# Patient Record
Sex: MALE | Race: OTHER | HISPANIC OR LATINO
[De-identification: names, ages, dates, MRNs, and addresses within clinical notes are randomized per-mention and may not be internally consistent; named-entity substitution may affect disease eponyms.]

---

## 2017-01-16 ENCOUNTER — FORM ENCOUNTER (OUTPATIENT)
Age: 82
End: 2017-01-16

## 2017-01-17 ENCOUNTER — APPOINTMENT (OUTPATIENT)
Dept: CT IMAGING | Facility: CLINIC | Age: 82
End: 2017-01-17

## 2017-01-17 ENCOUNTER — OUTPATIENT (OUTPATIENT)
Dept: OUTPATIENT SERVICES | Facility: HOSPITAL | Age: 82
LOS: 1 days | End: 2017-01-17
Payer: MEDICARE

## 2017-01-17 DIAGNOSIS — R91.1 SOLITARY PULMONARY NODULE: ICD-10-CM

## 2017-01-17 DIAGNOSIS — Z98.890 OTHER SPECIFIED POSTPROCEDURAL STATES: Chronic | ICD-10-CM

## 2017-01-17 PROCEDURE — 71250 CT THORAX DX C-: CPT

## 2017-02-09 ENCOUNTER — APPOINTMENT (OUTPATIENT)
Dept: PULMONOLOGY | Facility: CLINIC | Age: 82
End: 2017-02-09

## 2017-02-14 ENCOUNTER — OTHER (OUTPATIENT)
Age: 82
End: 2017-02-14

## 2017-03-06 ENCOUNTER — APPOINTMENT (OUTPATIENT)
Dept: PULMONOLOGY | Facility: CLINIC | Age: 82
End: 2017-03-06

## 2017-03-23 ENCOUNTER — EMERGENCY (EMERGENCY)
Facility: HOSPITAL | Age: 82
LOS: 1 days | Discharge: HOME | End: 2017-03-23
Attending: EMERGENCY MEDICINE
Payer: MEDICARE

## 2017-03-23 VITALS
OXYGEN SATURATION: 95 % | SYSTOLIC BLOOD PRESSURE: 147 MMHG | RESPIRATION RATE: 22 BRPM | HEART RATE: 80 BPM | WEIGHT: 225.09 LBS | DIASTOLIC BLOOD PRESSURE: 76 MMHG | HEIGHT: 65 IN | TEMPERATURE: 97 F

## 2017-03-23 VITALS
HEART RATE: 66 BPM | TEMPERATURE: 99 F | RESPIRATION RATE: 20 BRPM | DIASTOLIC BLOOD PRESSURE: 71 MMHG | OXYGEN SATURATION: 92 % | SYSTOLIC BLOOD PRESSURE: 151 MMHG

## 2017-03-23 DIAGNOSIS — R06.02 SHORTNESS OF BREATH: ICD-10-CM

## 2017-03-23 DIAGNOSIS — Z88.8 ALLERGY STATUS TO OTHER DRUGS, MEDICAMENTS AND BIOLOGICAL SUBSTANCES STATUS: ICD-10-CM

## 2017-03-23 DIAGNOSIS — Z88.2 ALLERGY STATUS TO SULFONAMIDES: ICD-10-CM

## 2017-03-23 DIAGNOSIS — Z79.899 OTHER LONG TERM (CURRENT) DRUG THERAPY: ICD-10-CM

## 2017-03-23 DIAGNOSIS — M54.5 LOW BACK PAIN: ICD-10-CM

## 2017-03-23 DIAGNOSIS — Z88.1 ALLERGY STATUS TO OTHER ANTIBIOTIC AGENTS STATUS: ICD-10-CM

## 2017-03-23 DIAGNOSIS — Z98.890 OTHER SPECIFIED POSTPROCEDURAL STATES: Chronic | ICD-10-CM

## 2017-03-23 DIAGNOSIS — I10 ESSENTIAL (PRIMARY) HYPERTENSION: ICD-10-CM

## 2017-03-23 LAB
ALBUMIN SERPL ELPH-MCNC: 3.9 G/DL — SIGNIFICANT CHANGE UP (ref 3.3–5.2)
ALP SERPL-CCNC: 72 U/L — SIGNIFICANT CHANGE UP (ref 40–120)
ALT FLD-CCNC: 14 U/L — SIGNIFICANT CHANGE UP
ANION GAP SERPL CALC-SCNC: 8 MMOL/L — SIGNIFICANT CHANGE UP (ref 5–17)
ANISOCYTOSIS BLD QL: SLIGHT — SIGNIFICANT CHANGE UP
APPEARANCE UR: CLEAR — SIGNIFICANT CHANGE UP
APTT BLD: 39 SEC — HIGH (ref 27.5–37.4)
AST SERPL-CCNC: 21 U/L — SIGNIFICANT CHANGE UP
BACTERIA # UR AUTO: ABNORMAL
BASOPHILS # BLD AUTO: 0 K/UL — SIGNIFICANT CHANGE UP (ref 0–0.2)
BILIRUB SERPL-MCNC: 0.3 MG/DL — LOW (ref 0.4–2)
BILIRUB UR-MCNC: NEGATIVE — SIGNIFICANT CHANGE UP
BUN SERPL-MCNC: 25 MG/DL — HIGH (ref 8–20)
CALCIUM SERPL-MCNC: 8.3 MG/DL — LOW (ref 8.6–10.2)
CHLORIDE SERPL-SCNC: 98 MMOL/L — SIGNIFICANT CHANGE UP (ref 98–107)
CO2 SERPL-SCNC: 36 MMOL/L — HIGH (ref 22–29)
COLOR SPEC: YELLOW — SIGNIFICANT CHANGE UP
CREAT SERPL-MCNC: 1.59 MG/DL — HIGH (ref 0.5–1.3)
DIFF PNL FLD: NEGATIVE — SIGNIFICANT CHANGE UP
EOSINOPHIL # BLD AUTO: 0 K/UL — SIGNIFICANT CHANGE UP (ref 0–0.5)
EPI CELLS # UR: SIGNIFICANT CHANGE UP
GLUCOSE SERPL-MCNC: 94 MG/DL — SIGNIFICANT CHANGE UP (ref 70–115)
GLUCOSE UR QL: NEGATIVE MG/DL — SIGNIFICANT CHANGE UP
HCT VFR BLD CALC: 35.3 % — LOW (ref 42–52)
HGB BLD-MCNC: 10.4 G/DL — LOW (ref 14–18)
INR BLD: 1.95 RATIO — HIGH (ref 0.88–1.16)
KETONES UR-MCNC: NEGATIVE — SIGNIFICANT CHANGE UP
LEUKOCYTE ESTERASE UR-ACNC: ABNORMAL
LYMPHOCYTES # BLD AUTO: 0.4 K/UL — LOW (ref 1–4.8)
LYMPHOCYTES # BLD AUTO: 4 % — LOW (ref 20–55)
MACROCYTES BLD QL: SLIGHT — SIGNIFICANT CHANGE UP
MCHC RBC-ENTMCNC: 26.3 PG — LOW (ref 27–31)
MCHC RBC-ENTMCNC: 29.5 G/DL — LOW (ref 32–36)
MCV RBC AUTO: 89.1 FL — SIGNIFICANT CHANGE UP (ref 80–94)
MICROCYTES BLD QL: SLIGHT — SIGNIFICANT CHANGE UP
MONOCYTES # BLD AUTO: 0.4 K/UL — SIGNIFICANT CHANGE UP (ref 0–0.8)
MONOCYTES NFR BLD AUTO: 5 % — SIGNIFICANT CHANGE UP (ref 3–10)
NEUTROPHILS # BLD AUTO: 9.4 K/UL — HIGH (ref 1.8–8)
NEUTROPHILS NFR BLD AUTO: 90 % — HIGH (ref 37–73)
NITRITE UR-MCNC: POSITIVE
NT-PROBNP SERPL-SCNC: 1355 PG/ML — HIGH (ref 0–300)
PH UR: 6.5 — SIGNIFICANT CHANGE UP (ref 4.8–8)
PLAT MORPH BLD: NORMAL — SIGNIFICANT CHANGE UP
PLATELET # BLD AUTO: 229 K/UL — SIGNIFICANT CHANGE UP (ref 150–400)
POIKILOCYTOSIS BLD QL AUTO: SLIGHT — SIGNIFICANT CHANGE UP
POTASSIUM SERPL-MCNC: 4.7 MMOL/L — SIGNIFICANT CHANGE UP (ref 3.5–5.3)
POTASSIUM SERPL-SCNC: 4.7 MMOL/L — SIGNIFICANT CHANGE UP (ref 3.5–5.3)
PROT SERPL-MCNC: 6.4 G/DL — LOW (ref 6.6–8.7)
PROT UR-MCNC: NEGATIVE MG/DL — SIGNIFICANT CHANGE UP
PROTHROM AB SERPL-ACNC: 21.7 SEC — HIGH (ref 9.8–12.7)
RBC # BLD: 3.96 M/UL — LOW (ref 4.6–6.2)
RBC # FLD: 16.4 % — HIGH (ref 11–15.6)
RBC BLD AUTO: ABNORMAL
RBC CASTS # UR COMP ASSIST: SIGNIFICANT CHANGE UP /HPF (ref 0–4)
SODIUM SERPL-SCNC: 142 MMOL/L — SIGNIFICANT CHANGE UP (ref 135–145)
SP GR SPEC: 1.01 — SIGNIFICANT CHANGE UP (ref 1.01–1.02)
TROPONIN T SERPL-MCNC: 0.04 NG/ML — SIGNIFICANT CHANGE UP (ref 0–0.06)
UROBILINOGEN FLD QL: NEGATIVE MG/DL — SIGNIFICANT CHANGE UP
VARIANT LYMPHS # BLD: 1 % — SIGNIFICANT CHANGE UP (ref 0–6)
WBC # BLD: 10.3 K/UL — SIGNIFICANT CHANGE UP (ref 4.8–10.8)
WBC # FLD AUTO: 10.3 K/UL — SIGNIFICANT CHANGE UP (ref 4.8–10.8)
WBC UR QL: ABNORMAL

## 2017-03-23 PROCEDURE — 72100 X-RAY EXAM L-S SPINE 2/3 VWS: CPT

## 2017-03-23 PROCEDURE — 93010 ELECTROCARDIOGRAM REPORT: CPT

## 2017-03-23 PROCEDURE — 72131 CT LUMBAR SPINE W/O DYE: CPT | Mod: 26

## 2017-03-23 PROCEDURE — 85610 PROTHROMBIN TIME: CPT

## 2017-03-23 PROCEDURE — 71010: CPT | Mod: 26

## 2017-03-23 PROCEDURE — 94640 AIRWAY INHALATION TREATMENT: CPT

## 2017-03-23 PROCEDURE — 72131 CT LUMBAR SPINE W/O DYE: CPT

## 2017-03-23 PROCEDURE — 96375 TX/PRO/DX INJ NEW DRUG ADDON: CPT

## 2017-03-23 PROCEDURE — 99284 EMERGENCY DEPT VISIT MOD MDM: CPT | Mod: 25

## 2017-03-23 PROCEDURE — 81001 URINALYSIS AUTO W/SCOPE: CPT

## 2017-03-23 PROCEDURE — 96374 THER/PROPH/DIAG INJ IV PUSH: CPT

## 2017-03-23 PROCEDURE — 93005 ELECTROCARDIOGRAM TRACING: CPT

## 2017-03-23 PROCEDURE — 99285 EMERGENCY DEPT VISIT HI MDM: CPT

## 2017-03-23 PROCEDURE — 84484 ASSAY OF TROPONIN QUANT: CPT

## 2017-03-23 PROCEDURE — 85027 COMPLETE CBC AUTOMATED: CPT

## 2017-03-23 PROCEDURE — 71045 X-RAY EXAM CHEST 1 VIEW: CPT

## 2017-03-23 PROCEDURE — 85730 THROMBOPLASTIN TIME PARTIAL: CPT

## 2017-03-23 PROCEDURE — 72100 X-RAY EXAM L-S SPINE 2/3 VWS: CPT | Mod: 26

## 2017-03-23 PROCEDURE — 83880 ASSAY OF NATRIURETIC PEPTIDE: CPT

## 2017-03-23 PROCEDURE — 87186 SC STD MICRODIL/AGAR DIL: CPT

## 2017-03-23 PROCEDURE — 80053 COMPREHEN METABOLIC PANEL: CPT

## 2017-03-23 PROCEDURE — 87086 URINE CULTURE/COLONY COUNT: CPT

## 2017-03-23 RX ORDER — FUROSEMIDE 40 MG
40 TABLET ORAL ONCE
Qty: 0 | Refills: 0 | Status: COMPLETED | OUTPATIENT
Start: 2017-03-23 | End: 2017-03-23

## 2017-03-23 RX ORDER — HYDROMORPHONE HYDROCHLORIDE 2 MG/ML
1 INJECTION INTRAMUSCULAR; INTRAVENOUS; SUBCUTANEOUS ONCE
Qty: 0 | Refills: 0 | Status: DISCONTINUED | OUTPATIENT
Start: 2017-03-23 | End: 2017-03-23

## 2017-03-23 RX ORDER — MORPHINE SULFATE 50 MG/1
4 CAPSULE, EXTENDED RELEASE ORAL ONCE
Qty: 0 | Refills: 0 | Status: DISCONTINUED | OUTPATIENT
Start: 2017-03-23 | End: 2017-03-23

## 2017-03-23 RX ORDER — IPRATROPIUM/ALBUTEROL SULFATE 18-103MCG
3 AEROSOL WITH ADAPTER (GRAM) INHALATION ONCE
Qty: 0 | Refills: 0 | Status: COMPLETED | OUTPATIENT
Start: 2017-03-23 | End: 2017-03-23

## 2017-03-23 RX ORDER — MAGNESIUM SULFATE 500 MG/ML
1 VIAL (ML) INJECTION ONCE
Qty: 0 | Refills: 0 | Status: COMPLETED | OUTPATIENT
Start: 2017-03-23 | End: 2017-03-23

## 2017-03-23 RX ORDER — SODIUM CHLORIDE 9 MG/ML
3 INJECTION INTRAMUSCULAR; INTRAVENOUS; SUBCUTANEOUS ONCE
Qty: 0 | Refills: 0 | Status: COMPLETED | OUTPATIENT
Start: 2017-03-23 | End: 2017-03-23

## 2017-03-23 RX ADMIN — HYDROMORPHONE HYDROCHLORIDE 1 MILLIGRAM(S): 2 INJECTION INTRAMUSCULAR; INTRAVENOUS; SUBCUTANEOUS at 15:59

## 2017-03-23 RX ADMIN — Medication 100 GRAM(S): at 13:43

## 2017-03-23 RX ADMIN — MORPHINE SULFATE 4 MILLIGRAM(S): 50 CAPSULE, EXTENDED RELEASE ORAL at 13:43

## 2017-03-23 RX ADMIN — Medication 40 MILLIGRAM(S): at 15:59

## 2017-03-23 RX ADMIN — Medication 125 MILLIGRAM(S): at 13:43

## 2017-03-23 RX ADMIN — Medication 3 MILLILITER(S): at 14:01

## 2017-03-23 RX ADMIN — MORPHINE SULFATE 4 MILLIGRAM(S): 50 CAPSULE, EXTENDED RELEASE ORAL at 13:44

## 2017-03-23 RX ADMIN — SODIUM CHLORIDE 3 MILLILITER(S): 9 INJECTION INTRAMUSCULAR; INTRAVENOUS; SUBCUTANEOUS at 13:44

## 2017-03-23 RX ADMIN — Medication 3 MILLILITER(S): at 15:15

## 2017-03-23 NOTE — ED ADULT TRIAGE NOTE - CHIEF COMPLAINT QUOTE
Patient arrived in wheelchair, awake, alert, and oriented times 3, breathing labored.  As per wife patient has started to have labored breathing which started 4 days ago.  patient present in ED complaining of lower back pain which started last night.  patient had similar back pain 2 weeks ago and subsided.  patient on home O2 due to CHF and COPD

## 2017-03-23 NOTE — ED ADULT NURSE REASSESSMENT NOTE - NS ED NURSE REASSESS COMMENT FT1
pt by accident pulled out iv at xray, new one placed
iv placed labs sent pt on cardiac monitor, pt medicated as per md orders
pt stable for discharge iv removed discharge instruction given with understanding, pt taken out via wheelhair

## 2017-03-23 NOTE — ED ADULT NURSE NOTE - OBJECTIVE STATEMENT
pt arrived with family with severe sob, and back pain, as per wife pt did not hurt his back but has lower back pain 9/10 woke up with this pain denies trauma, unable to walk or sleep without being in pain, states started weeks ago and progressively has gotten worse, pt with history of copd wears 02 at home pt with sob, and labored breathing, denies chest pain

## 2017-03-23 NOTE — ED PROVIDER NOTE - PMH
A-fib    CHF (congestive heart failure)    COPD (chronic obstructive pulmonary disease)    HTN (hypertension)    Urinary retention

## 2017-03-23 NOTE — ED PROVIDER NOTE - OBJECTIVE STATEMENT
Pt is an 86 year old male with hx of COPD on home oxygen and CHF comes in with SOB for past few days as well as lower back pain. No fall or injury. It ok when he is stationary but gets worse with attempts to move. No chest pain. Pt is an 86 year old male with hx of COPD on home oxygen and CHF comes in with SOB for past few days as well as lower back pain. No fall or injury. It ok when he is stationary but gets worse with attempts to move or get up.  No chest pain.  No abdominal pain. His sob is baseline and not worse then usual. He uses a adams cath chronically. No dysuria. Pain does not travel down the leg.

## 2017-03-23 NOTE — ED PROVIDER NOTE - NS ED ROS FT
Review of Systems:  	•	CONSTITUTIONAL : no fever or weight change  	•	SKIN : no rash  	•	HEMATOLOGIC : no petechia, no bruising  	•	EYES : no eye pain, no blurred vision  	•	ENT : no change in hearing, no sore throat  	•	RESPIRATORY : + sob  	•	CARDIAC : no chest pain, no palpitations  	•	GI : + abd pain  	•	MUSCULOSKELETAL : + lower back pain  	•	NEUROLOGIC : no weakness, no headache, no anesthesia, no paresthesias

## 2017-03-25 NOTE — ED POST DISCHARGE NOTE - RESULT SUMMARY
Spoke with PT and advised of urine culture results. He reports he has a catheter and wishes to call his urologist jeremiah. He does not want abx rx now and will contact his urologist t

## 2017-04-10 ENCOUNTER — INPATIENT (INPATIENT)
Facility: HOSPITAL | Age: 82
LOS: 3 days | Discharge: HOME | DRG: 190 | End: 2017-04-14
Attending: HOSPITALIST | Admitting: INTERNAL MEDICINE
Payer: MEDICARE

## 2017-04-10 VITALS
DIASTOLIC BLOOD PRESSURE: 65 MMHG | OXYGEN SATURATION: 92 % | HEART RATE: 77 BPM | WEIGHT: 214.95 LBS | SYSTOLIC BLOOD PRESSURE: 151 MMHG | TEMPERATURE: 98 F | HEIGHT: 66 IN | RESPIRATION RATE: 18 BRPM

## 2017-04-10 DIAGNOSIS — Z29.9 ENCOUNTER FOR PROPHYLACTIC MEASURES, UNSPECIFIED: ICD-10-CM

## 2017-04-10 DIAGNOSIS — G47.30 SLEEP APNEA, UNSPECIFIED: ICD-10-CM

## 2017-04-10 DIAGNOSIS — Z98.890 OTHER SPECIFIED POSTPROCEDURAL STATES: Chronic | ICD-10-CM

## 2017-04-10 DIAGNOSIS — I48.91 UNSPECIFIED ATRIAL FIBRILLATION: ICD-10-CM

## 2017-04-10 DIAGNOSIS — J44.9 CHRONIC OBSTRUCTIVE PULMONARY DISEASE, UNSPECIFIED: ICD-10-CM

## 2017-04-10 DIAGNOSIS — K92.2 GASTROINTESTINAL HEMORRHAGE, UNSPECIFIED: ICD-10-CM

## 2017-04-10 DIAGNOSIS — R05 COUGH: ICD-10-CM

## 2017-04-10 DIAGNOSIS — E66.9 OBESITY, UNSPECIFIED: ICD-10-CM

## 2017-04-10 DIAGNOSIS — R09.02 HYPOXEMIA: ICD-10-CM

## 2017-04-10 LAB
ALBUMIN SERPL ELPH-MCNC: 3.8 G/DL — SIGNIFICANT CHANGE UP (ref 3.3–5.2)
ALP SERPL-CCNC: 64 U/L — SIGNIFICANT CHANGE UP (ref 40–120)
ALT FLD-CCNC: 19 U/L — SIGNIFICANT CHANGE UP
ANION GAP SERPL CALC-SCNC: 8 MMOL/L — SIGNIFICANT CHANGE UP (ref 5–17)
ANISOCYTOSIS BLD QL: SLIGHT — SIGNIFICANT CHANGE UP
APPEARANCE UR: CLEAR — SIGNIFICANT CHANGE UP
APTT BLD: 37.4 SEC — SIGNIFICANT CHANGE UP (ref 27.5–37.4)
AST SERPL-CCNC: 19 U/L — SIGNIFICANT CHANGE UP
BACTERIA # UR AUTO: ABNORMAL
BILIRUB SERPL-MCNC: 0.2 MG/DL — LOW (ref 0.4–2)
BILIRUB UR-MCNC: NEGATIVE — SIGNIFICANT CHANGE UP
BLD GP AB SCN SERPL QL: SIGNIFICANT CHANGE UP
BUN SERPL-MCNC: 38 MG/DL — HIGH (ref 8–20)
CALCIUM SERPL-MCNC: 8.6 MG/DL — SIGNIFICANT CHANGE UP (ref 8.6–10.2)
CHLORIDE SERPL-SCNC: 99 MMOL/L — SIGNIFICANT CHANGE UP (ref 98–107)
CO2 SERPL-SCNC: 37 MMOL/L — HIGH (ref 22–29)
COLOR SPEC: YELLOW — SIGNIFICANT CHANGE UP
CREAT SERPL-MCNC: 1.95 MG/DL — HIGH (ref 0.5–1.3)
DIFF PNL FLD: ABNORMAL
EPI CELLS # UR: SIGNIFICANT CHANGE UP
GLUCOSE SERPL-MCNC: 129 MG/DL — HIGH (ref 70–115)
GLUCOSE UR QL: NEGATIVE MG/DL — SIGNIFICANT CHANGE UP
HCT VFR BLD CALC: 30.3 % — LOW (ref 42–52)
HCT VFR BLD CALC: 30.7 % — LOW (ref 42–52)
HGB BLD-MCNC: 9 G/DL — LOW (ref 14–18)
HGB BLD-MCNC: 9 G/DL — LOW (ref 14–18)
HYALINE CASTS # UR AUTO: ABNORMAL /LPF
HYPOCHROMIA BLD QL: SLIGHT — SIGNIFICANT CHANGE UP
INR BLD: 2.71 RATIO — HIGH (ref 0.88–1.16)
KETONES UR-MCNC: NEGATIVE — SIGNIFICANT CHANGE UP
LEUKOCYTE ESTERASE UR-ACNC: ABNORMAL
LYMPHOCYTES # BLD AUTO: 0.4 K/UL — LOW (ref 1–4.8)
LYMPHOCYTES # BLD AUTO: 3 % — LOW (ref 20–55)
MACROCYTES BLD QL: SLIGHT — SIGNIFICANT CHANGE UP
MCHC RBC-ENTMCNC: 25.9 PG — LOW (ref 27–31)
MCHC RBC-ENTMCNC: 26.1 PG — LOW (ref 27–31)
MCHC RBC-ENTMCNC: 29.3 G/DL — LOW (ref 32–36)
MCHC RBC-ENTMCNC: 29.7 G/DL — LOW (ref 32–36)
MCV RBC AUTO: 87.8 FL — SIGNIFICANT CHANGE UP (ref 80–94)
MCV RBC AUTO: 88.2 FL — SIGNIFICANT CHANGE UP (ref 80–94)
MICROCYTES BLD QL: SLIGHT — SIGNIFICANT CHANGE UP
MONOCYTES # BLD AUTO: 0.7 K/UL — SIGNIFICANT CHANGE UP (ref 0–0.8)
MONOCYTES NFR BLD AUTO: 6 % — SIGNIFICANT CHANGE UP (ref 3–10)
NEUTROPHILS # BLD AUTO: 11.9 K/UL — HIGH (ref 1.8–8)
NEUTROPHILS NFR BLD AUTO: 90 % — HIGH (ref 37–73)
NITRITE UR-MCNC: NEGATIVE — SIGNIFICANT CHANGE UP
NT-PROBNP SERPL-SCNC: 1865 PG/ML — HIGH (ref 0–300)
OB PNL STL: POSITIVE
PH UR: 7 — SIGNIFICANT CHANGE UP (ref 4.8–8)
PLAT MORPH BLD: NORMAL — SIGNIFICANT CHANGE UP
PLATELET # BLD AUTO: 244 K/UL — SIGNIFICANT CHANGE UP (ref 150–400)
PLATELET # BLD AUTO: 253 K/UL — SIGNIFICANT CHANGE UP (ref 150–400)
POIKILOCYTOSIS BLD QL AUTO: SLIGHT — SIGNIFICANT CHANGE UP
POTASSIUM SERPL-MCNC: 4.7 MMOL/L — SIGNIFICANT CHANGE UP (ref 3.5–5.3)
POTASSIUM SERPL-SCNC: 4.7 MMOL/L — SIGNIFICANT CHANGE UP (ref 3.5–5.3)
PROT SERPL-MCNC: 6.2 G/DL — LOW (ref 6.6–8.7)
PROT UR-MCNC: NEGATIVE MG/DL — SIGNIFICANT CHANGE UP
PROTHROM AB SERPL-ACNC: 30.4 SEC — HIGH (ref 9.8–12.7)
RBC # BLD: 3.45 M/UL — LOW (ref 4.6–6.2)
RBC # BLD: 3.48 M/UL — LOW (ref 4.6–6.2)
RBC # FLD: 15.6 % — SIGNIFICANT CHANGE UP (ref 11–15.6)
RBC # FLD: 15.7 % — HIGH (ref 11–15.6)
RBC BLD AUTO: ABNORMAL
RBC CASTS # UR COMP ASSIST: SIGNIFICANT CHANGE UP /HPF (ref 0–4)
SODIUM SERPL-SCNC: 144 MMOL/L — SIGNIFICANT CHANGE UP (ref 135–145)
SP GR SPEC: 1.01 — SIGNIFICANT CHANGE UP (ref 1.01–1.02)
TROPONIN T SERPL-MCNC: 0.04 NG/ML — SIGNIFICANT CHANGE UP (ref 0–0.06)
TYPE + AB SCN PNL BLD: SIGNIFICANT CHANGE UP
UROBILINOGEN FLD QL: NEGATIVE MG/DL — SIGNIFICANT CHANGE UP
VARIANT LYMPHS # BLD: 1 % — SIGNIFICANT CHANGE UP (ref 0–6)
WBC # BLD: 11.4 K/UL — HIGH (ref 4.8–10.8)
WBC # BLD: 13 K/UL — HIGH (ref 4.8–10.8)
WBC # FLD AUTO: 11.4 K/UL — HIGH (ref 4.8–10.8)
WBC # FLD AUTO: 13 K/UL — HIGH (ref 4.8–10.8)
WBC UR QL: ABNORMAL

## 2017-04-10 PROCEDURE — 99223 1ST HOSP IP/OBS HIGH 75: CPT

## 2017-04-10 PROCEDURE — 99222 1ST HOSP IP/OBS MODERATE 55: CPT

## 2017-04-10 PROCEDURE — 71010: CPT | Mod: 26

## 2017-04-10 PROCEDURE — 99285 EMERGENCY DEPT VISIT HI MDM: CPT

## 2017-04-10 RX ORDER — HYDRALAZINE HCL 50 MG
50 TABLET ORAL
Qty: 0 | Refills: 0 | Status: DISCONTINUED | OUTPATIENT
Start: 2017-04-10 | End: 2017-04-11

## 2017-04-10 RX ORDER — AZITHROMYCIN 500 MG/1
500 TABLET, FILM COATED ORAL EVERY 24 HOURS
Qty: 0 | Refills: 0 | Status: COMPLETED | OUTPATIENT
Start: 2017-04-11 | End: 2017-04-14

## 2017-04-10 RX ORDER — ERGOCALCIFEROL 1.25 MG/1
0 CAPSULE ORAL
Qty: 0 | Refills: 0 | COMMUNITY

## 2017-04-10 RX ORDER — PANTOPRAZOLE SODIUM 20 MG/1
40 TABLET, DELAYED RELEASE ORAL
Qty: 0 | Refills: 0 | Status: DISCONTINUED | OUTPATIENT
Start: 2017-04-10 | End: 2017-04-12

## 2017-04-10 RX ORDER — POTASSIUM CHLORIDE 20 MEQ
1 PACKET (EA) ORAL
Qty: 0 | Refills: 0 | COMMUNITY

## 2017-04-10 RX ORDER — FUROSEMIDE 40 MG
40 TABLET ORAL ONCE
Qty: 0 | Refills: 0 | Status: COMPLETED | OUTPATIENT
Start: 2017-04-10 | End: 2017-04-10

## 2017-04-10 RX ORDER — ALBUTEROL 90 UG/1
2.5 AEROSOL, METERED ORAL ONCE
Qty: 0 | Refills: 0 | Status: COMPLETED | OUTPATIENT
Start: 2017-04-10 | End: 2017-04-10

## 2017-04-10 RX ORDER — ALLOPURINOL 300 MG
100 TABLET ORAL DAILY
Qty: 0 | Refills: 0 | Status: DISCONTINUED | OUTPATIENT
Start: 2017-04-10 | End: 2017-04-14

## 2017-04-10 RX ORDER — DOCUSATE SODIUM 100 MG
100 CAPSULE ORAL AT BEDTIME
Qty: 0 | Refills: 0 | Status: DISCONTINUED | OUTPATIENT
Start: 2017-04-10 | End: 2017-04-14

## 2017-04-10 RX ORDER — IPRATROPIUM/ALBUTEROL SULFATE 18-103MCG
3 AEROSOL WITH ADAPTER (GRAM) INHALATION ONCE
Qty: 0 | Refills: 0 | Status: COMPLETED | OUTPATIENT
Start: 2017-04-10 | End: 2017-04-10

## 2017-04-10 RX ORDER — DILTIAZEM HCL 120 MG
240 CAPSULE, EXT RELEASE 24 HR ORAL DAILY
Qty: 0 | Refills: 0 | Status: DISCONTINUED | OUTPATIENT
Start: 2017-04-10 | End: 2017-04-14

## 2017-04-10 RX ORDER — IPRATROPIUM/ALBUTEROL SULFATE 18-103MCG
3 AEROSOL WITH ADAPTER (GRAM) INHALATION EVERY 6 HOURS
Qty: 0 | Refills: 0 | Status: DISCONTINUED | OUTPATIENT
Start: 2017-04-10 | End: 2017-04-14

## 2017-04-10 RX ORDER — AZITHROMYCIN 500 MG/1
TABLET, FILM COATED ORAL
Qty: 0 | Refills: 0 | Status: COMPLETED | OUTPATIENT
Start: 2017-04-10 | End: 2017-04-14

## 2017-04-10 RX ORDER — POLYETHYLENE GLYCOL 3350 17 G/17G
17 POWDER, FOR SOLUTION ORAL
Qty: 0 | Refills: 0 | Status: DISCONTINUED | OUTPATIENT
Start: 2017-04-10 | End: 2017-04-14

## 2017-04-10 RX ORDER — AZITHROMYCIN 500 MG/1
500 TABLET, FILM COATED ORAL ONCE
Qty: 0 | Refills: 0 | Status: COMPLETED | OUTPATIENT
Start: 2017-04-10 | End: 2017-04-10

## 2017-04-10 RX ADMIN — Medication 3 MILLILITER(S): at 13:23

## 2017-04-10 RX ADMIN — Medication 50 MILLIGRAM(S): at 18:13

## 2017-04-10 RX ADMIN — AZITHROMYCIN 255 MILLIGRAM(S): 500 TABLET, FILM COATED ORAL at 19:47

## 2017-04-10 RX ADMIN — Medication 40 MILLIGRAM(S): at 17:10

## 2017-04-10 RX ADMIN — Medication 3 MILLILITER(S): at 21:44

## 2017-04-10 RX ADMIN — Medication 100 MILLIGRAM(S): at 21:45

## 2017-04-10 RX ADMIN — Medication 40 MILLIGRAM(S): at 21:43

## 2017-04-10 RX ADMIN — ALBUTEROL 2.5 MILLIGRAM(S): 90 AEROSOL, METERED ORAL at 13:00

## 2017-04-10 RX ADMIN — PANTOPRAZOLE SODIUM 40 MILLIGRAM(S): 20 TABLET, DELAYED RELEASE ORAL at 18:13

## 2017-04-10 NOTE — CONSULT NOTE ADULT - SUBJECTIVE AND OBJECTIVE BOX
PULMONARY CONSULT NOTE      JESS HUNTER  MRN-[id number]    Patient is a 86y old  Male who presents with a chief complaint of SOB, LE edema (10 Apr 2017 15:13)    HISTORY OF PRESENT ILLNESS:    86 y M hx afib on coumadin, COPD on 2L home O2, neurogenic bladder w chronic indwelling cathether, CHF, anemia, presented to ER c/o several day hx worsening SOB, LE edema since past 2 days. Today noted BRBPR upon wiping. Has refused colonoscopy in the past. Denies CP, F/C, N/v/D/C, abd pain. Coumadin held 2 d ago due to increased bruising. Wife also reports several recent falls at home, denies head trauma or LOC. Prednisone dose recently increased without improvement in SOB.  He reports some improvement since coming to ER.      MEDICATIONS  (STANDING):  methylPREDNISolone sodium succinate Injectable 40milliGRAM(s) IV Push every 8 hours  ALBUTerol/ipratropium for Nebulization 3milliLiter(s) Nebulizer every 6 hours  pantoprazole  Injectable 40milliGRAM(s) IV Push two times a day  diltiazem   CD 240milliGRAM(s) Oral daily  allopurinol 100milliGRAM(s) Oral daily  hydrALAZINE 50milliGRAM(s) Oral two times a day  polyethylene glycol 3350 17Gram(s) Oral two times a day  docusate sodium 100milliGRAM(s) Oral at bedtime      MEDICATIONS  (PRN):      Allergies    Ambien (Unknown)  Celebrex (Unknown)  Levaquin (Unknown)  Spiriva (Unknown)    Intolerances        PAST MEDICAL & SURGICAL HISTORY:  Urinary retention  A-fib  COPD (chronic obstructive pulmonary disease)  HTN (hypertension)  CHF (congestive heart failure)  No pertinent past medical history  History of hip surgery: Right hip and knee replacement  No significant past surgical history      FAMILY HISTORY:  No pertinent family history in first degree relatives      SOCIAL HISTORY  Smoking History:     REVIEW OF SYSTEMS:    CONSTITUTIONAL:  No fevers, chills, sweats    HEENT:  Eyes:  No diplopia or blurred vision. ENT:  No earache, sore throat or runny nose.    CARDIOVASCULAR:  No pressure, squeezing, tightness, or heaviness about the chest; no palpitations.    RESPIRATORY:  Per HPI    GASTROINTESTINAL:  No abdominal pain, nausea, vomiting or diarrhea.    GENITOURINARY:  No dysuria, frequency or urgency.    NEUROLOGIC:  No paresthesias, fasciculations, seizures or weakness.    PSYCHIATRIC:  No disorder of thought or mood.    Vital Signs Last 24 Hrs  T(C): 36.8, Max: 36.8 (04-10 @ 18:17)  T(F): 98.3, Max: 98.3 (04-10 @ 18:17)  HR: 79 (60 - 80)  BP: 162/98 (151/65 - 167/72)  BP(mean): --  RR: 18 (18 - 22)  SpO2: 95% (92% - 99%)    PHYSICAL EXAMINATION:    GENERAL: The patient is a well-developed, well-nourished obese male in no apparent distress.     HEENT: Head is normocephalic and atraumatic. Extraocular muscles are intact. Mucous membranes are moist.     NECK: Supple.     LUNGS: Wheeze b/l. Respirations unlabored    HEART: Regular rate and rhythm without murmur.    ABDOMEN: Soft, nontender, and nondistended.  No hepatosplenomegaly is noted.    EXTREMITIES: Without any cyanosis, clubbing, rash, lesions or edema.    NEUROLOGIC: Grossly intact.      LABS:                        9.0    13.0  )-----------( 244      ( 10 Apr 2017 13:47 )             30.7     04-10    144  |  99  |  38.0<H>  ----------------------------<  129<H>  4.7   |  37.0<H>  |  1.95<H>    Ca    8.6      10 Apr 2017 13:47    TPro  6.2<L>  /  Alb  3.8  /  TBili  0.2<L>  /  DBili  x   /  AST  19  /  ALT  19  /  AlkPhos  64  04-10    PT/INR - ( 10 Apr 2017 13:47 )   PT: 30.4 sec;   INR: 2.71 ratio         PTT - ( 10 Apr 2017 13:47 )  PTT:37.4 sec  Urinalysis Basic - ( 10 Apr 2017 17:37 )    Color: Yellow / Appearance: Clear / S.010 / pH: x  Gluc: x / Ketone: Negative  / Bili: Negative / Urobili: Negative mg/dL   Blood: x / Protein: Negative mg/dL / Nitrite: Negative   Leuk Esterase: Moderate / RBC: 0-2 /HPF / WBC 6-10   Sq Epi: x / Non Sq Epi: Occasional / Bacteria: Few        CARDIAC MARKERS ( 10 Apr 2017 13:47 )  x     / 0.04 ng/mL / x     / x     / x            Serum Pro-Brain Natriuretic Peptide: 1865 pg/mL (04-10 @ 13:47)      RADIOLOGY & ADDITIONAL STUDIES:    EXAM:  CHEST SINGLE VIEW FRONTAL                          PROCEDURE DATE:  04/10/2017        INTERPRETATION:  History: dyspnea    Technique:  AP portable    Comparisons:  Chest x-ray dated 2016    Findings: The lungs are clear. There are no infiltrates, congestion or   pleural effusions. Pulmonary vasculature is normal. Aortic ectasia..   Cardiomegaly unchanged.. The thorax is normal for age.    Impression: No acute pulmonary disease. Cardiomegaly.    No interval change        RAÚL GRAYSON M.D., ATTENDING RADIOLOGIST  This document has been electronically signed. Apr 10 2017  1:26PM PULMONARY CONSULT NOTE      JESS HUNTER  MRN-[id number]    Patient is a 86y old  Male who presents with a chief complaint of SOB, LE edema (10 Apr 2017 15:13)    HISTORY OF PRESENT ILLNESS:    86 y M hx afib on coumadin, COPD on 2L home O2, neurogenic bladder w chronic indwelling cathether, CHF, anemia, presented to ER c/o several day hx worsening SOB, LE edema since past 2 days. Today noted BRBPR upon wiping. Has refused colonoscopy in the past. Denies CP, F/C, N/v/D/C, abd pain. Coumadin held 2 d ago due to increased bruising. Wife also reports several recent falls at home, denies head trauma or LOC. Prednisone dose recently increased without improvement in SOB.  He reports some improvement since coming to ER. Patient also with reported ZENIA but not compliant with CPAP.      MEDICATIONS  (STANDING):  methylPREDNISolone sodium succinate Injectable 40milliGRAM(s) IV Push every 8 hours  ALBUTerol/ipratropium for Nebulization 3milliLiter(s) Nebulizer every 6 hours  pantoprazole  Injectable 40milliGRAM(s) IV Push two times a day  diltiazem   CD 240milliGRAM(s) Oral daily  allopurinol 100milliGRAM(s) Oral daily  hydrALAZINE 50milliGRAM(s) Oral two times a day  polyethylene glycol 3350 17Gram(s) Oral two times a day  docusate sodium 100milliGRAM(s) Oral at bedtime      MEDICATIONS  (PRN):      Allergies    Ambien (Unknown)  Celebrex (Unknown)  Levaquin (Unknown)  Spiriva (Unknown)    Intolerances        PAST MEDICAL & SURGICAL HISTORY:  Urinary retention  A-fib  COPD (chronic obstructive pulmonary disease)  HTN (hypertension)  CHF (congestive heart failure)  No pertinent past medical history  History of hip surgery: Right hip and knee replacement  No significant past surgical history      FAMILY HISTORY:  No pertinent family history in first degree relatives      SOCIAL HISTORY  Smoking History:     REVIEW OF SYSTEMS:    CONSTITUTIONAL:  No fevers, chills, sweats    HEENT:  Eyes:  No diplopia or blurred vision. ENT:  No earache, sore throat or runny nose.    CARDIOVASCULAR:  No pressure, squeezing, tightness, or heaviness about the chest; no palpitations.    RESPIRATORY:  Per HPI    GASTROINTESTINAL:  No abdominal pain, nausea, vomiting or diarrhea.    GENITOURINARY:  No dysuria, frequency or urgency.    NEUROLOGIC:  No paresthesias, fasciculations, seizures or weakness.    PSYCHIATRIC:  No disorder of thought or mood.    Vital Signs Last 24 Hrs  T(C): 36.8, Max: 36.8 (04-10 @ 18:17)  T(F): 98.3, Max: 98.3 (04-10 @ 18:17)  HR: 79 (60 - 80)  BP: 162/98 (151/65 - 167/72)  BP(mean): --  RR: 18 (18 - 22)  SpO2: 95% (92% - 99%)    PHYSICAL EXAMINATION:    GENERAL: The patient is a well-developed, well-nourished obese male in no apparent distress.     HEENT: Head is normocephalic and atraumatic. Extraocular muscles are intact. Mucous membranes are moist.     NECK: Supple.     LUNGS: Wheeze b/l. Respirations unlabored    HEART: Regular rate and rhythm without murmur.    ABDOMEN: Soft, nontender, and nondistended.  No hepatosplenomegaly is noted.    EXTREMITIES: Without any cyanosis, clubbing, rash, lesions or edema.    NEUROLOGIC: Grossly intact.      LABS:                        9.0    13.0  )-----------( 244      ( 10 Apr 2017 13:47 )             30.7     04-10    144  |  99  |  38.0<H>  ----------------------------<  129<H>  4.7   |  37.0<H>  |  1.95<H>    Ca    8.6      10 Apr 2017 13:47    TPro  6.2<L>  /  Alb  3.8  /  TBili  0.2<L>  /  DBili  x   /  AST  19  /  ALT  19  /  AlkPhos  64  04-10    PT/INR - ( 10 Apr 2017 13:47 )   PT: 30.4 sec;   INR: 2.71 ratio         PTT - ( 10 Apr 2017 13:47 )  PTT:37.4 sec  Urinalysis Basic - ( 10 Apr 2017 17:37 )    Color: Yellow / Appearance: Clear / S.010 / pH: x  Gluc: x / Ketone: Negative  / Bili: Negative / Urobili: Negative mg/dL   Blood: x / Protein: Negative mg/dL / Nitrite: Negative   Leuk Esterase: Moderate / RBC: 0-2 /HPF / WBC 6-10   Sq Epi: x / Non Sq Epi: Occasional / Bacteria: Few        CARDIAC MARKERS ( 10 Apr 2017 13:47 )  x     / 0.04 ng/mL / x     / x     / x            Serum Pro-Brain Natriuretic Peptide: 1865 pg/mL (04-10 @ 13:47)      RADIOLOGY & ADDITIONAL STUDIES:    EXAM:  CHEST SINGLE VIEW FRONTAL                          PROCEDURE DATE:  04/10/2017        INTERPRETATION:  History: dyspnea    Technique:  AP portable    Comparisons:  Chest x-ray dated 2016    Findings: The lungs are clear. There are no infiltrates, congestion or   pleural effusions. Pulmonary vasculature is normal. Aortic ectasia..   Cardiomegaly unchanged.. The thorax is normal for age.    Impression: No acute pulmonary disease. Cardiomegaly.    No interval change        RAÚL GRAYSON M.D., ATTENDING RADIOLOGIST  This document has been electronically signed. Apr 10 2017  1:26PM

## 2017-04-10 NOTE — ED PROVIDER NOTE - OBJECTIVE STATEMENT
Pt. present to ED with progressive SOB and b/l leg edema.  No chest pain. No cough. No fever. Pt. has hx of COPD(on O2 daily) and CHF and A-fib. Pt. is also on daily PO steroids. Pt. also has adams catheter due to non-functional bladder. Pt. has been noticing increasing bruising to his arms lately. Pt. was told to withhold his coumadin. Pt. has been off coumadin for ONE day only. Pt. has not taken his coumadin today. Family also noticed blood-bright red in pt's stool today. Pt. states that he was constipated for the past 2 days until today.

## 2017-04-10 NOTE — H&P ADULT - ASSESSMENT
86 y M hx CHF, CKD, anemia, afib on coumadin, chronic indwelling adams, COPD on home O2, adm for SOB, LE edema, rectal bleeding.     SOB: COPD exacerbation w diastolic CHF component - IV steroids, nebs, O2. diuresis as needed. pulm and cardiology evaluation.   Afib: rate controlled, INR therapeutic. hold for now w GIB. cardio eval of longterm AC given recent falls   Rectal bleed: monitor cbc, ppi. GI eval.   Anemia: chronic, stable, continue to monitor   CKD: monitor Cr, lytes. renally dose meds. check ua.   Urinary retention: chronic indwelling adams, draining well.     Prophyl: pt anticoagulated 86 y M hx CHF, CKD, anemia, afib on coumadin, chronic indwelling adams, COPD on home O2, adm for SOB, LE edema, rectal bleeding.     SOB: COPD exacerbation w diastolic CHF component - IV steroids, nebs, O2. diuresis as needed. pulm and cardiology evaluation.   Afib: rate controlled, INR therapeutic. hold for now w GIB. cardio eval of longterm AC given recent falls   Rectal bleed: monitor cbc, ppi. GI eval.   Anemia: chronic, stable, continue to monitor   CKD: monitor Cr, lytes. renally dose meds. check ua.   Urinary retention: chronic indwelling adams, draining well.     Prophyl: pt anticoagulated  Code status: undecided, family to discuss.

## 2017-04-10 NOTE — ED PROVIDER NOTE - PROGRESS NOTE DETAILS
Pt. slight improvement after breathing tx. Less wheezing. Pt. will be admitted for COPD/CHF. Case discussed with Dr. Escalante. She will admit the patient.

## 2017-04-10 NOTE — CONSULT NOTE ADULT - ASSESSMENT
IMPRESSION:  86 y M hx afib on coumadin, COPD on 2L home O2, neurogenic bladder w chronic indwelling cathether, CHF, anemia, presented to ER c/o several day hx worsening SOB, LE edema since past 2 days. Today noted BRBPR upon wiping. Has refused colonoscopy in the past. GI consult for constipation and fresh blood per rectum. The DDx includes internal hemorrhoids vs diverticular vs r/o colonic malignancy.      PLAN:  monitor cbc  management of COPD  IV/oral PPI  oral laxatives  GI clinic upon discharge. will consider outpatient colonoscopy once COPD exacerbation has resolved (and if patient agrees)    thanks for the consult.

## 2017-04-10 NOTE — H&P ADULT - HISTORY OF PRESENT ILLNESS
86 y M hx afib on coumadin, COPD on 2L home O2, neurogenic bladder w chronic indwelling cathether, CHF, anemia, presented to ER c/o several day hx worsening SOB, LE edema since past 2 days. Today noted BRBPR upon wiping. Has refused colonoscopy in the past. Denies CP, F/C, N/v/D/C, abd pain. Coumadin held 2 d ago due to increased bruising. Wife also reports several recent falls at home, denies head trauma or LOC. Prednisone dose recently increased without improvement in SOB.

## 2017-04-10 NOTE — H&P ADULT - NSHPPHYSICALEXAM_GEN_ALL_CORE
Vital Signs Last 24 Hrs  T(C): 36.6, Max: 36.6 (04-10 @ 10:53)  T(F): 97.9, Max: 97.9 (04-10 @ 10:53)  HR: 60 (60 - 77)  BP: 167/72 (151/65 - 167/72)  BP(mean): --  RR: 22 (18 - 22)  SpO2: 99% (92% - 99%)  Vital Signs Last 24 Hrs  T(C): 36.6, Max: 36.6 (04-10 @ 10:53)  T(F): 97.9, Max: 97.9 (04-10 @ 10:53)  HR: 60 (60 - 77)  BP: 167/72 (151/65 - 167/72)  BP(mean): --  RR: 22 (18 - 22)  SpO2: 99% (92% - 99%)    PHYSICAL EXAM-  GENERAL: alert, NAD  HEAD:  Atraumatic, Normocephalic  EYES: EOMI,  conjunctiva and sclera clear  NECK: Supple   CHEST/LUNG: diffuse wheezes bilaterally  HEART: irregular; No murmurs   ABDOMEN: Soft, Nontender, Nondistended; Bowel sounds present  : adams bag w clear yellow urine  EXTREMITIES:  2+ Peripheral Pulses, No clubbing, cyanosis, 1+edema  NERVOUS SYSTEM:  Alert & Oriented X3, Motor Strength 5/5 B/L upper and lower extremities; CN grossly intact  SKIN: scattered ecchymoses  PSYCH: normal affect

## 2017-04-10 NOTE — ED ADULT TRIAGE NOTE - CHIEF COMPLAINT QUOTE
'I am SOB and have swelling in my feet and blood in the stool" Pt is on 2L NC. Pt takes Coumadin was taking 5mg, saw MD Saturday and was told to stop taking for 2 days and to resume taking 4mg on Tuesday.  Pt's MD is Kristopher Clayton. Pt has bruising to upper extremities and a Magdaleno catheter in place, for a non functioning urinary bladder.

## 2017-04-10 NOTE — CONSULT NOTE ADULT - ASSESSMENT
87 y/o morbidly obese male with hx of COPD on home O2, chronic diastolic heart failure and chronic atrial fibrillation presenting with worsening SOB and progressive LE edema  with evidence of BRBPR   - clinical picture consistent with acute COPD exacerbation ( expiratory wheezing, no significant pulmonary congestion ) pulmonary consulted , likely need IV steroids  - possible right sided heart failure (corpulmonale) given LE edema, JVD, given IV lasix 40 mg today but will hold further IV lasix due to SHMUEL (creatinine above baseline of 1.3)  - repeat echo is reasonable given change of clinical picture and to evaluate for RV function and degree of pulmonary hypertension ( mild PH in 11/2016)  - Continue coumadin for now for Afib (CHADSVASC 4 ) conferring a 6.7 % risk of stroke, risk of bleeding is 5.8% by HAS BLED score (not including major bleeding or predisposition to bleeding) will discuss with family and primary care further during this hospitalization.  HTN uncontrolled, continue diltiazem  mg, can increase hydralazine if BP remains uncontrolled.

## 2017-04-10 NOTE — CONSULT NOTE ADULT - SUBJECTIVE AND OBJECTIVE BOX
CARDIOLOGY CONSULTATION NOTE ([facility])      History obtained by: Patient and medical record     obtained: No    Chief complaint:    Patient is a 86y old  Male who presents with a chief complaint of SOB, LE edema (10 Apr 2017 15:13)      HPI:  86 y M hx afib on coumadin, chronic diastolic heart failure, COPD on 2L home O2, neurogenic bladder w chronic indwelling cathether,  anemia, presented to ER c/o several days of  worsening SOB to be at rest (FC IV), no orthopnea or PND. Also with progressive  LE edema for the past few days.  He discontinued coumadin for 2 days as per PCP due to multiple bruising and had BRBPR today in stolls. Wife also reports several recent falls at home due to balance problems. He has no history of CAD, CVA and has not been hospitalized for worsening SOB for years.         REVIEW OF SYMPTOMS: Cardiovascular:  as per HPI Respiratory:  as per HPI  Genitourinary:  No dysuria, no hematuria; Gastrointestinal:  No nausea, no vomiting. No diarrhea.  No abdominal pain. BRBPR ; Neurological: No headache, no dizziness, no slurred speech;  Psychiatric: No agitation, no anxiety.  ALL OTHER REVIEW OF SYSTEMS ARE NEGATIVE except as noted per HPI    ALLERGIES: Allergies    Ambien (Unknown)  Celebrex (Unknown)  Levaquin (Unknown)  Spiriva (Unknown)            MEDICATIONS  (STANDING):  furosemide   Injectable 40milliGRAM(s) IV Push Once  methylPREDNISolone sodium succinate Injectable 40milliGRAM(s) IV Push every 8 hours  ALBUTerol/ipratropium for Nebulization 3milliLiter(s) Nebulizer every 6 hours  pantoprazole  Injectable 40milliGRAM(s) IV Push two times a day  diltiazem   CD 240milliGRAM(s) Oral daily  allopurinol 100milliGRAM(s) Oral daily  hydrALAZINE 50milliGRAM(s) Oral two times a day  polyethylene glycol 3350 17Gram(s) Oral two times a day  docusate sodium 100milliGRAM(s) Oral at bedtime    MEDICATIONS  (PRN):      CURRENT MEDICATIONS:  furosemide   Injectable 40milliGRAM(s) IV Push Once  diltiazem   CD 240milliGRAM(s) Oral daily  hydrALAZINE 50milliGRAM(s) Oral two times a day    ALBUTerol/ipratropium for Nebulization   pantoprazole  Injectable  polyethylene glycol 3350  docusate sodium  methylPREDNISolone sodium succinate Injectable  allopurinol        PAST MEDICAL HISTORY  Urinary retention  A-fib  COPD (chronic obstructive pulmonary disease)  HTN (hypertension)  CHF (congestive heart failure)  No pertinent past medical history      PAST SURGICAL HISTORY  History of hip surgery  No significant past surgical history      FAMILY HISTORY:  No pertinent family history in first degree relatives      SOCIAL HISTORY:  Denies smoking/alcohol/drugs      Vital Signs Last 24 Hrs  T(C): 36.6, Max: 36.6 (04-10 @ 10:53)  T(F): 97.9, Max: 97.9 (04-10 @ 10:53)  HR: 60 (60 - 77)  BP: 167/72 (151/65 - 167/72)  BP(mean): --  RR: 22 (18 - 22)  SpO2: 99% (92% - 99%)      PHYSICAL EXAM:  Constitutional: Moderate respiratory distress, wheezing   HEENT: Atraumatic and normcephalic , neck is supple . +JVD 10 cm above sternal angle No carotid bruit. PEERL   CNS: A&Ox3. No focal deficits. EOMI.   Lymph Nodes: Cervical : Not palpable.  Respiratory: diffuse inspiratory and expiratory wheezing, diminished air entry b/l , no rales  Cardiovascular: S1 variable in intensity S2 irregularly irregular rhythm, PSM over LSB  Gastrointestinal: Soft non-tender and non distended . +Bowel sounds. n  Extremities: bilateral +2 LE edema, pulses not felt due to edema  Psychiatric: Calm . no agitation.  Skin: No skin lesions    Intake and output:     LABS:                        9.0    13.0  )-----------( 244      ( 10 Apr 2017 13:47 )             30.7     04-10    144  |  99  |  38.0<H>  ----------------------------<  129<H>  4.7   |  37.0<H>  |  1.95<H>    Ca    8.6      10 Apr 2017 13:47    TPro  6.2<L>  /  Alb  3.8  /  TBili  0.2<L>  /  DBili  x   /  AST  19  /  ALT  19  /  AlkPhos  64  04-10    CARDIAC MARKERS ( 10 Apr 2017 13:47 )  x     / 0.04 ng/mL / x     / x     / x        ;p-BNP=Serum Pro-Brain Natriuretic Peptide: 1865 pg/mL (04-10 @ 13:47)    PT/INR - ( 10 Apr 2017 13:47 )   PT: 30.4 sec;   INR: 2.71 ratio         PTT - ( 10 Apr 2017 13:47 )  PTT:37.4 sec      INTERPRETATION OF TELEMETRY:  ECG: Atrial fibrillation with PVCs, poor r -w ave progression    RADIOLOGY & ADDITIONAL STUDIES:    X-ray:  Cardiomegaly, no pulmonary congestion    ECHO FINDINGS   Summary:   1. Left ventricular ejection fraction, by visual estimation, is 60 to   65%.   2. Normal global left ventricular systolic function.   3. Severely dilated right atrium.   4. Mild to moderate mitral annular calcification.   5. Thickening and calcification of the anterior and posterior mitral   valve leaflets.   6. Moderate tricuspid regurgitation.   7. Sclerotic aortic valve with decreased opening.   8. Estimated pulmonary artery systolic pressure is 44.2 mmHg assuming a   right atrial pressure of 3 mmHg, which is consistent with mild pulmonary   hypertension.   9. Severely enlarged left atrium.  10. The aortic valve mean gradient is 21.2 mmHg consistent with moderate   aortic stenosis.                 ;

## 2017-04-10 NOTE — CONSULT NOTE ADULT - SUBJECTIVE AND OBJECTIVE BOX
HPI:  86 y M hx afib on coumadin, COPD on 2L home O2, neurogenic bladder w chronic indwelling cathether, CHF, anemia, presented to ER c/o several day hx worsening SOB, LE edema since past 2 days. Today noted BRBPR upon wiping. Has refused colonoscopy in the past. Denies CP, F/C, N/v/D/C, abd pain. Coumadin held 2 d ago due to increased bruising. Wife also reports several recent falls at home, denies head trauma or LOC. Prednisone dose recently increased without improvement in SOB. (10 Apr 2017 15:13)    was admitted to [facility] with back pain in 03/17. c/o constipation. noted fresh blood per rectum on wiping himself. no abd pain. no egd or colonoscopy before.       PAST MEDICAL & SURGICAL HISTORY:  Urinary retention  A-fib  COPD (chronic obstructive pulmonary disease)  HTN (hypertension)  CHF (congestive heart failure)  No pertinent past medical history  History of hip surgery: Right hip and knee replacement  No significant past surgical history      REVIEW OF SYSTEMS    General: unremarkable, no fever    Skin/Breast: unremarkable  	  Ophthalmologic: unremarkable  	  ENMT:	unremarkable    Respiratory and Thorax: unremarkable  	  Cardiovascular:	unremarkable    Gastrointestinal:	 as above    Genitourinary:	unremarkable    Musculoskeletal:	unremarkable    Neurological:	unremarkable    Psychiatric:	unremarkable    Hematology/Lymphatics:	unremarkable    Endocrine:	unremarkable    Allergic/Immunologic:	unremarkable      MEDICATIONS  (STANDING):  furosemide   Injectable 40milliGRAM(s) IV Push Once  methylPREDNISolone sodium succinate Injectable 40milliGRAM(s) IV Push every 8 hours  ALBUTerol/ipratropium for Nebulization 3milliLiter(s) Nebulizer every 6 hours  pantoprazole  Injectable 40milliGRAM(s) IV Push two times a day  diltiazem   CD 240milliGRAM(s) Oral daily  allopurinol 100milliGRAM(s) Oral daily  hydrALAZINE 50milliGRAM(s) Oral two times a day    MEDICATIONS  (PRN):      Allergies    Ambien (Unknown)  Celebrex (Unknown)  Levaquin (Unknown)  Spiriva (Unknown)    Intolerances        SOCIAL HISTORY:    FAMILY HISTORY:  No pertinent family history in first degree relatives      Vital Signs Last 24 Hrs  T(C): 36.6, Max: 36.6 (04-10 @ 10:53)  T(F): 97.9, Max: 97.9 (04-10 @ 10:53)  HR: 60 (60 - 77)  BP: 167/72 (151/65 - 167/72)  BP(mean): --  RR: 22 (18 - 22)  SpO2: 99% (92% - 99%)      PHYSICAL EXAM:    Constitutional: no fever, hemodynamically stable    Eyes: unremarkable    ENMT: unremarkable    Neck: unremarkable    Breasts: deferred    Back: unremarkable    Respiratory: unremarkable    Cardiovascular: unremarkable    Gastrointestinal: bowel sounds present, soft, non-tender, no organomegaly    Genitourinary: deferred    Rectal: deferred    Extremities: unremarkable    Vascular: unremarkable    Neurological: Awake, alert, oriented x3, no focal neurological deficit    Skin: unremarkable    Lymph Nodes: deferred    Musculoskeletal: unremarkable    Psychiatric: unremarkable    LABS:                        9.0    13.0  )-----------( 244      ( 10 Apr 2017 13:47 )             30.7     04-10    144  |  99  |  38.0<H>  ----------------------------<  129<H>  4.7   |  37.0<H>  |  1.95<H>    Ca    8.6      10 Apr 2017 13:47    TPro  6.2<L>  /  Alb  3.8  /  TBili  0.2<L>  /  DBili  x   /  AST  19  /  ALT  19  /  AlkPhos  64  04-10    PT/INR - ( 10 Apr 2017 13:47 )   PT: 30.4 sec;   INR: 2.71 ratio         PTT - ( 10 Apr 2017 13:47 )  PTT:37.4 sec      RADIOLOGY & ADDITIONAL STUDIES:

## 2017-04-10 NOTE — ED ADULT NURSE NOTE - OBJECTIVE STATEMENT
pt states that he has been sob with difficulty breathing for one week. pt states that he has been to his PMD was given antibiotic and steroids. breathing got worse. pt has bilateral 2+pitting edema. audible exp. wheezes heard.  pt has multi bruised areas present to bilateral arms states he is on coumadin.

## 2017-04-10 NOTE — H&P ADULT - NSHPLABSRESULTS_GEN_ALL_CORE
CXR: Impression: No acute pulmonary disease. Cardiomegaly.  No interval change    EKG: afib, PVCs    Echo Nov 2016:  Summary:   1. Left ventricular ejection fraction, by visual estimation, is 60 to   65%.   2. Normal global left ventricular systolic function.   3. Severely dilated right atrium.   4. Mild to moderate mitral annular calcification.   5.Thickening and calcification of the anterior and posterior mitral   valve leaflets.   6. Moderate tricuspid regurgitation.   7. Sclerotic aortic valve with decreased opening.   8. Estimated pulmonary artery systolic pressure is 44.2 mmHg assuming a   right atrial pressure of 3 mmHg, which is consistent with mild pulmonary   hypertension.   9. Severely enlarged left atrium.  10. The aortic valve mean gradient is 21.2 mmHg consistent with moderate   aortic stenosis.

## 2017-04-11 DIAGNOSIS — E66.9 OBESITY, UNSPECIFIED: ICD-10-CM

## 2017-04-11 DIAGNOSIS — K62.5 HEMORRHAGE OF ANUS AND RECTUM: ICD-10-CM

## 2017-04-11 DIAGNOSIS — N30.00 ACUTE CYSTITIS WITHOUT HEMATURIA: ICD-10-CM

## 2017-04-11 DIAGNOSIS — J44.9 CHRONIC OBSTRUCTIVE PULMONARY DISEASE, UNSPECIFIED: ICD-10-CM

## 2017-04-11 DIAGNOSIS — I50.32 CHRONIC DIASTOLIC (CONGESTIVE) HEART FAILURE: ICD-10-CM

## 2017-04-11 DIAGNOSIS — G47.30 SLEEP APNEA, UNSPECIFIED: ICD-10-CM

## 2017-04-11 DIAGNOSIS — I48.2 CHRONIC ATRIAL FIBRILLATION: ICD-10-CM

## 2017-04-11 DIAGNOSIS — J44.1 CHRONIC OBSTRUCTIVE PULMONARY DISEASE WITH (ACUTE) EXACERBATION: ICD-10-CM

## 2017-04-11 DIAGNOSIS — I10 ESSENTIAL (PRIMARY) HYPERTENSION: ICD-10-CM

## 2017-04-11 DIAGNOSIS — I50.33 ACUTE ON CHRONIC DIASTOLIC (CONGESTIVE) HEART FAILURE: ICD-10-CM

## 2017-04-11 LAB
ANION GAP SERPL CALC-SCNC: 6 MMOL/L — SIGNIFICANT CHANGE UP (ref 5–17)
BUN SERPL-MCNC: 37 MG/DL — HIGH (ref 8–20)
CALCIUM SERPL-MCNC: 8.3 MG/DL — LOW (ref 8.6–10.2)
CHLORIDE SERPL-SCNC: 99 MMOL/L — SIGNIFICANT CHANGE UP (ref 98–107)
CO2 SERPL-SCNC: 38 MMOL/L — HIGH (ref 22–29)
CREAT SERPL-MCNC: 1.73 MG/DL — HIGH (ref 0.5–1.3)
GLUCOSE SERPL-MCNC: 165 MG/DL — HIGH (ref 70–115)
HCT VFR BLD CALC: 27.9 % — LOW (ref 42–52)
HCT VFR BLD CALC: 30.9 % — LOW (ref 42–52)
HGB BLD-MCNC: 8.3 G/DL — LOW (ref 14–18)
HGB BLD-MCNC: 9.1 G/DL — LOW (ref 14–18)
INR BLD: 2.07 RATIO — HIGH (ref 0.88–1.16)
MCHC RBC-ENTMCNC: 25.8 PG — LOW (ref 27–31)
MCHC RBC-ENTMCNC: 26 PG — LOW (ref 27–31)
MCHC RBC-ENTMCNC: 29.4 G/DL — LOW (ref 32–36)
MCHC RBC-ENTMCNC: 29.7 G/DL — LOW (ref 32–36)
MCV RBC AUTO: 87.5 FL — SIGNIFICANT CHANGE UP (ref 80–94)
MCV RBC AUTO: 87.5 FL — SIGNIFICANT CHANGE UP (ref 80–94)
PLATELET # BLD AUTO: 223 K/UL — SIGNIFICANT CHANGE UP (ref 150–400)
PLATELET # BLD AUTO: 262 K/UL — SIGNIFICANT CHANGE UP (ref 150–400)
POTASSIUM SERPL-MCNC: 4.5 MMOL/L — SIGNIFICANT CHANGE UP (ref 3.5–5.3)
POTASSIUM SERPL-SCNC: 4.5 MMOL/L — SIGNIFICANT CHANGE UP (ref 3.5–5.3)
PROTHROM AB SERPL-ACNC: 23.1 SEC — HIGH (ref 9.8–12.7)
RBC # BLD: 3.19 M/UL — LOW (ref 4.6–6.2)
RBC # BLD: 3.53 M/UL — LOW (ref 4.6–6.2)
RBC # FLD: 15.2 % — SIGNIFICANT CHANGE UP (ref 11–15.6)
RBC # FLD: 15.4 % — SIGNIFICANT CHANGE UP (ref 11–15.6)
SODIUM SERPL-SCNC: 143 MMOL/L — SIGNIFICANT CHANGE UP (ref 135–145)
WBC # BLD: 10.5 K/UL — SIGNIFICANT CHANGE UP (ref 4.8–10.8)
WBC # BLD: 15.2 K/UL — HIGH (ref 4.8–10.8)
WBC # FLD AUTO: 10.5 K/UL — SIGNIFICANT CHANGE UP (ref 4.8–10.8)
WBC # FLD AUTO: 15.2 K/UL — HIGH (ref 4.8–10.8)

## 2017-04-11 PROCEDURE — 99233 SBSQ HOSP IP/OBS HIGH 50: CPT

## 2017-04-11 PROCEDURE — 93970 EXTREMITY STUDY: CPT | Mod: 26

## 2017-04-11 RX ORDER — CEFTRIAXONE 500 MG/1
1 INJECTION, POWDER, FOR SOLUTION INTRAMUSCULAR; INTRAVENOUS ONCE
Qty: 0 | Refills: 0 | Status: COMPLETED | OUTPATIENT
Start: 2017-04-11 | End: 2017-04-11

## 2017-04-11 RX ORDER — WARFARIN SODIUM 2.5 MG/1
7 TABLET ORAL ONCE
Qty: 0 | Refills: 0 | Status: COMPLETED | OUTPATIENT
Start: 2017-04-11 | End: 2017-04-11

## 2017-04-11 RX ORDER — HYDRALAZINE HCL 50 MG
50 TABLET ORAL EVERY 8 HOURS
Qty: 0 | Refills: 0 | Status: DISCONTINUED | OUTPATIENT
Start: 2017-04-11 | End: 2017-04-14

## 2017-04-11 RX ORDER — CEFTRIAXONE 500 MG/1
INJECTION, POWDER, FOR SOLUTION INTRAMUSCULAR; INTRAVENOUS
Qty: 0 | Refills: 0 | Status: DISCONTINUED | OUTPATIENT
Start: 2017-04-11 | End: 2017-04-14

## 2017-04-11 RX ORDER — FUROSEMIDE 40 MG
40 TABLET ORAL DAILY
Qty: 0 | Refills: 0 | Status: DISCONTINUED | OUTPATIENT
Start: 2017-04-11 | End: 2017-04-13

## 2017-04-11 RX ORDER — CEFTRIAXONE 500 MG/1
1 INJECTION, POWDER, FOR SOLUTION INTRAMUSCULAR; INTRAVENOUS EVERY 24 HOURS
Qty: 0 | Refills: 0 | Status: DISCONTINUED | OUTPATIENT
Start: 2017-04-12 | End: 2017-04-14

## 2017-04-11 RX ADMIN — Medication 100 MILLIGRAM(S): at 12:54

## 2017-04-11 RX ADMIN — Medication 40 MILLIGRAM(S): at 06:44

## 2017-04-11 RX ADMIN — Medication 3 MILLILITER(S): at 15:00

## 2017-04-11 RX ADMIN — Medication 50 MILLIGRAM(S): at 06:44

## 2017-04-11 RX ADMIN — Medication 3 MILLILITER(S): at 08:34

## 2017-04-11 RX ADMIN — Medication 50 MILLIGRAM(S): at 12:54

## 2017-04-11 RX ADMIN — POLYETHYLENE GLYCOL 3350 17 GRAM(S): 17 POWDER, FOR SOLUTION ORAL at 18:07

## 2017-04-11 RX ADMIN — Medication 100 MILLIGRAM(S): at 22:29

## 2017-04-11 RX ADMIN — Medication 40 MILLIGRAM(S): at 18:07

## 2017-04-11 RX ADMIN — PANTOPRAZOLE SODIUM 40 MILLIGRAM(S): 20 TABLET, DELAYED RELEASE ORAL at 06:43

## 2017-04-11 RX ADMIN — Medication 40 MILLIGRAM(S): at 12:54

## 2017-04-11 RX ADMIN — PANTOPRAZOLE SODIUM 40 MILLIGRAM(S): 20 TABLET, DELAYED RELEASE ORAL at 18:07

## 2017-04-11 RX ADMIN — Medication 240 MILLIGRAM(S): at 06:44

## 2017-04-11 RX ADMIN — CEFTRIAXONE 100 GRAM(S): 500 INJECTION, POWDER, FOR SOLUTION INTRAMUSCULAR; INTRAVENOUS at 18:04

## 2017-04-11 RX ADMIN — WARFARIN SODIUM 7 MILLIGRAM(S): 2.5 TABLET ORAL at 22:29

## 2017-04-11 RX ADMIN — Medication 3 MILLILITER(S): at 02:56

## 2017-04-11 RX ADMIN — Medication 3 MILLILITER(S): at 20:55

## 2017-04-11 RX ADMIN — AZITHROMYCIN 255 MILLIGRAM(S): 500 TABLET, FILM COATED ORAL at 19:17

## 2017-04-11 NOTE — PROGRESS NOTE ADULT - SUBJECTIVE AND OBJECTIVE BOX
PULMONARY PROGRESS NOTE      [id number]    Patient is a 86y old  Male who presents with a chief complaint of SOB, LE edema (10 Apr 2017 15:13)      INTERVAL HPI/OVERNIGHT EVENTS:  feel sig better  no fever, chills, chest pain  lives with wife   non compliant with cpap, uses 02 night time  has home neb    MEDICATIONS  (STANDING):  methylPREDNISolone sodium succinate Injectable 40milliGRAM(s) IV Push every 8 hours  ALBUTerol/ipratropium for Nebulization 3milliLiter(s) Nebulizer every 6 hours  pantoprazole  Injectable 40milliGRAM(s) IV Push two times a day  diltiazem   CD 240milliGRAM(s) Oral daily  allopurinol 100milliGRAM(s) Oral daily  polyethylene glycol 3350 17Gram(s) Oral two times a day  docusate sodium 100milliGRAM(s) Oral at bedtime  azithromycin  IVPB  IV Intermittent   azithromycin  IVPB 500milliGRAM(s) IV Intermittent every 24 hours  hydrALAZINE 50milliGRAM(s) Oral every 8 hours      MEDICATIONS  (PRN):      Allergies    Ambien (Unknown)  Celebrex (Unknown)  Levaquin (Unknown)  Spiriva (Unknown)    Intolerances        PAST MEDICAL & SURGICAL HISTORY:  Urinary retention  A-fib  COPD (chronic obstructive pulmonary disease)  HTN (hypertension)  CHF (congestive heart failure)  No pertinent past medical history  History of hip surgery: Right hip and knee replacement  No significant past surgical history      SOCIAL HISTORY  Smoking History:       REVIEW OF SYSTEMS:    CONSTITUTIONAL:  No distress    HEENT:  Eyes:  No diplopia or blurred vision. ENT:  No earache, sore throat or runny nose.    CARDIOVASCULAR:  No pressure, squeezing, tightness, heaviness or aching about the chest; no palpitations.    RESPIRATORY:  see hpi    GASTROINTESTINAL:  No nausea, vomiting or diarrhea.    GENITOURINARY:  No dysuria, frequency or urgency.    NEUROLOGIC:  No paresthesias, fasciculations, seizures or weakness.    PSYCHIATRIC:  No disorder of thought or mood.    Vital Signs Last 24 Hrs  T(C): 36.7, Max: 36.8 (04-10 @ 18:17)  T(F): 98, Max: 98.3 (04-10 @ 18:17)  HR: 105 (60 - 105)  BP: 144/76 (144/76 - 167/72)  BP(mean): --  RR: 20 (18 - 22)  SpO2: 93% (92% - 99%)    PHYSICAL EXAMINATION:    GENERAL: The patient is awake and alert in no apparent distress.     HEENT: Head is normocephalic and atraumatic. Extraocular muscles are intact. Mucous membranes are moist.    NECK: Supple.    LUNGS: moderate air entry bilat with exp rhonchi; respirations unlabored    HEART: IRRegular rate and rhythm with 2/6 systolic  murmur.    ABDOMEN: Soft, nontender, and nondistended.      EXTREMITIES: With 1 plus edema.    NEUROLOGIC: Grossly intact.    LABS:                        9.1    15.2  )-----------( 262      ( 2017 09:23 )             30.9     04-11    143  |  99  |  37.0<H>  ----------------------------<  165<H>  4.5   |  38.0<H>  |  1.73<H>    Ca    8.3<L>      2017 04:05    TPro  6.2<L>  /  Alb  3.8  /  TBili  0.2<L>  /  DBili  x   /  AST  19  /  ALT  19  /  AlkPhos  64  04-10    PT/INR - ( 2017 04:05 )   PT: 23.1 sec;   INR: 2.07 ratio         PTT - ( 10 Apr 2017 13:47 )  PTT:37.4 sec  Urinalysis Basic - ( 10 Apr 2017 17:37 )    Color: Yellow / Appearance: Clear / S.010 / pH: x  Gluc: x / Ketone: Negative  / Bili: Negative / Urobili: Negative mg/dL   Blood: x / Protein: Negative mg/dL / Nitrite: Negative   Leuk Esterase: Moderate / RBC: 0-2 /HPF / WBC 6-10   Sq Epi: x / Non Sq Epi: Occasional / Bacteria: Few        CARDIAC MARKERS ( 10 Apr 2017 13:47 )  x     / 0.04 ng/mL / x     / x     / x            Serum Pro-Brain Natriuretic Peptide: 1865 pg/mL (04-10 @ 13:47)          MICROBIOLOGY:    RADIOLOGY & ADDITIONAL STUDIES:  CXR reviewed and compared, ECHO noted

## 2017-04-11 NOTE — PROGRESS NOTE ADULT - SUBJECTIVE AND OBJECTIVE BOX
CARDIOLOGY PROGRESS NOTE   ([facility])                             Reason for follow up: Acute diastolic heart failure                            Overnight: No new events.       Subjective: improved SOB    Review of symptoms: Cardiac:  No chest pain.   Respiratory: + cough  Gastrointestinal: No diarrhea. No abdominal pain. No bleeding.     Past medical history: No updates.   Chronic conditions:  Atrial fibrillation rate controlled   	  Vitals:  T(C): 36.7, Max: 36.8 (04-10 @ 18:17)  HR: 105 (60 - 105)  BP: 144/76 (144/76 - 167/72)  RR: 20 (18 - 22)  SpO2: 93% (93% - 99%)  Wt(kg): --  I&O's Summary  I & Os for 24h ending 11 Apr 2017 07:00  =============================================  IN: 0 ml / OUT: 850 ml / NET: -850 ml    I & Os for current day (as of 11 Apr 2017 11:20)  =============================================  IN: 120 ml / OUT: 200 ml / NET: -80 ml    Weight (kg): 97.5 (04-10 @ 10:53)    PHYSICAL EXAM:  Appearance: Comfortable. sitting in chair ,No acute distress  HEENT:  Head and neck: Atraumatic. Normocephalic.  Normal oral mucosa, PERRL, Neck is supple. elevated JVD 10 cm   Neurologic: A & O x 3, no focal deficits. EOMI ,  Lymphatic: No cervical lymphadenopathy  Cardiovascular: Normal S1 S2, No murmur appreciated, rubs/gallops. elevated JVD 10 cm  Respiratory: expiratory wheezes improved from yesterday  Gastrointestinal:  Soft, Non-tender, + BS  Lower Extremities: +1 edema  Psychiatry: Patient is calm. No agitation. Mood & affect appropriate  Skin: No rashes, + ecchymosis on bilateral UE    CURRENT MEDICATIONS:  diltiazem   CD 240milliGRAM(s) Oral daily  hydrALAZINE 50milliGRAM(s) Oral every 8 hours    ALBUTerol/ipratropium for Nebulization  azithromycin  IVPB  azithromycin  IVPB  pantoprazole  Injectable  polyethylene glycol 3350  docusate sodium  allopurinol  methylPREDNISolone sodium succinate Injectable      LABS:	 	                          9.1    15.2  )-----------( 262      ( 11 Apr 2017 09:23 )             30.9     04-11    143  |  99  |  37.0<H>  ----------------------------<  165<H>  4.5   |  38.0<H>  |  1.73<H>    Ca    8.3<L>      11 Apr 2017 04:05    TPro  6.2<L>  /  Alb  3.8  /  TBili  0.2<L>  /  DBili  x   /  AST  19  /  ALT  19  /  AlkPhos  64  04-10    proBNP: Serum Pro-Brain Natriuretic Peptide: 1865 pg/mL (04-10 @ 13:47)      TELEMETRY: 	  no events , rate conttrolled  	    DIAGNOSTIC TESTING:  [ ] Echocardiogram: preserved EF , moderate AS, enlarged LA 11/2016

## 2017-04-11 NOTE — PROGRESS NOTE ADULT - SUBJECTIVE AND OBJECTIVE BOX
is an 86 y M hx afib on coumadin, COPD on 2L home O2, neurogenic bladder w chronic indwelling cathether, CHF, anemia, presented to ER c/o several day hx worsening SOB, LE edema since past 2 days. He noticed BRBPR upon wiping.     He appears to be feeling better but has a hx of UTIs and I'm checking u/a and urine culture today. He looks clinically comfortable and I think that he had some hemorrhoidal bleeding on coumadin. He denies hx of stroke or falls, and for now, I'm going to resume his coumadin. His hemoglobin appears stable and there are no signs of major bleeding.      Summary:   REVIEW OF SYSTEMS    General: "I feel good."	    Skin/Breast:  	  Ophthalmologic:  	  ENMT:	    Respiratory and Thorax:  	  Cardiovascular:	    Gastrointestinal:	    Genitourinary:	    Musculoskeletal:	    Neurological:	    Psychiatric:	    Hematology/Lymphatics:	    Endocrine:	    Allergic/Immunologic:	  Vital Signs Last 24 Hrs  T(C): 36.8, Max: 36.8 (04-10 @ 18:17)  T(F): 98.3, Max: 98.3 (04-10 @ 18:17)  HR: 106 (70 - 106)  BP: 130/80 (130/80 - 162/98)  BP(mean): --  RR: 18 (18 - 22)  SpO2: 98% (93% - 99%)  PHYSICAL EXAM:  GEN: NAD, obese, sitting up in a chair, pleasant, talkative, comfortable, +strong odor of urine in the room  HEENT: MMM  CVS: S1S2 RRR  PULM: CTABL, good breath sounds  ABD: soft, nontender, no rebound, no guarding, obese  EXTREM: no edema  NEURO: intact  PSYCH  SKIN:                          9.1    15.2  )-----------( 262      ( 11 Apr 2017 09:23 )             30.9     04-11    143  |  99  |  37.0<H>  ----------------------------<  165<H>  4.5   |  38.0<H>  |  1.73<H>    Ca    8.3<L>      11 Apr 2017 04:05    TPro  6.2<L>  /  Alb  3.8  /  TBili  0.2<L>  /  DBili  x   /  AST  19  /  ALT  19  /  AlkPhos  64  04-10    LIVER FUNCTIONS - ( 10 Apr 2017 13:47 )  Alb: 3.8 g/dL / Pro: 6.2 g/dL / ALK PHOS: 64 U/L / ALT: 19 U/L / AST: 19 U/L / GGT: x           PT/INR - ( 11 Apr 2017 04:05 )   PT: 23.1 sec;   INR: 2.07 ratio         PTT - ( 10 Apr 2017 13:47 )  PTT:37.4 sec    Radiology:     MEDICATIONS  (STANDING):  ALBUTerol/ipratropium for Nebulization 3milliLiter(s) Nebulizer every 6 hours  pantoprazole  Injectable 40milliGRAM(s) IV Push two times a day  diltiazem   CD 240milliGRAM(s) Oral daily  allopurinol 100milliGRAM(s) Oral daily  polyethylene glycol 3350 17Gram(s) Oral two times a day  docusate sodium 100milliGRAM(s) Oral at bedtime  azithromycin  IVPB  IV Intermittent   azithromycin  IVPB 500milliGRAM(s) IV Intermittent every 24 hours  hydrALAZINE 50milliGRAM(s) Oral every 8 hours  methylPREDNISolone sodium succinate Injectable 40milliGRAM(s) IV Push every 12 hours  furosemide   Injectable 40milliGRAM(s) IV Push daily  cefTRIAXone   IVPB  IV Intermittent   warfarin 7milliGRAM(s) Oral once    MEDICATIONS  (PRN):

## 2017-04-11 NOTE — PROGRESS NOTE ADULT - ASSESSMENT
Obesity/ZENIA/COPD - overlap  CHFpEF,Diastolic dysfunction, severely  enlarged left atrium/r atrium, mod AS (11/16)  Home 02 depd, non compliant with cpap  CKD, anemia    clinically improved  decrease medrol, mobilize  gentle diuresis, follow lytes, renal function  continue nebs, 02  prognosis guarded Obesity/ZENIA/OHS/COPD - overlap,   CHFpEF,Diastolic dysfunction, severely  enlarged left atrium/r atrium, mod AS (11/16)  Home 02 depd, non compliant with cpap  CKD, anemia, stool guaiac positive, needs GI eval , AC per cardiology    clinically improved  decrease medrol, mobilize  gentle diuresis, follow lytes, renal function  continue nebs, 02  prognosis guarded

## 2017-04-12 LAB
-  AMIKACIN: SIGNIFICANT CHANGE UP
-  AMPICILLIN/SULBACTAM: SIGNIFICANT CHANGE UP
-  AMPICILLIN: SIGNIFICANT CHANGE UP
-  AMPICILLIN: SIGNIFICANT CHANGE UP
-  AZTREONAM: SIGNIFICANT CHANGE UP
-  CEFAZOLIN: SIGNIFICANT CHANGE UP
-  CEFEPIME: SIGNIFICANT CHANGE UP
-  CEFOXITIN: SIGNIFICANT CHANGE UP
-  CEFTAZIDIME: SIGNIFICANT CHANGE UP
-  CEFTRIAXONE: SIGNIFICANT CHANGE UP
-  CIPROFLOXACIN: SIGNIFICANT CHANGE UP
-  ERTAPENEM: SIGNIFICANT CHANGE UP
-  GENTAMICIN: SIGNIFICANT CHANGE UP
-  IMIPENEM: SIGNIFICANT CHANGE UP
-  LEVOFLOXACIN: SIGNIFICANT CHANGE UP
-  MEROPENEM: SIGNIFICANT CHANGE UP
-  NITROFURANTOIN: SIGNIFICANT CHANGE UP
-  NITROFURANTOIN: SIGNIFICANT CHANGE UP
-  PIPERACILLIN/TAZOBACTAM: SIGNIFICANT CHANGE UP
-  TETRACYCLINE: SIGNIFICANT CHANGE UP
-  TOBRAMYCIN: SIGNIFICANT CHANGE UP
-  TRIMETHOPRIM/SULFAMETHOXAZOLE: SIGNIFICANT CHANGE UP
-  VANCOMYCIN: SIGNIFICANT CHANGE UP
ANION GAP SERPL CALC-SCNC: 9 MMOL/L — SIGNIFICANT CHANGE UP (ref 5–17)
BASE EXCESS BLDA CALC-SCNC: 12.5 MMOL/L — HIGH (ref -3–3)
BLOOD GAS COMMENTS ARTERIAL: SIGNIFICANT CHANGE UP
BUN SERPL-MCNC: 53 MG/DL — HIGH (ref 8–20)
CALCIUM SERPL-MCNC: 8.6 MG/DL — SIGNIFICANT CHANGE UP (ref 8.6–10.2)
CHLORIDE SERPL-SCNC: 95 MMOL/L — LOW (ref 98–107)
CO2 SERPL-SCNC: 39 MMOL/L — HIGH (ref 22–29)
CREAT SERPL-MCNC: 1.8 MG/DL — HIGH (ref 0.5–1.3)
CULTURE RESULTS: SIGNIFICANT CHANGE UP
GAS PNL BLDA: SIGNIFICANT CHANGE UP
GLUCOSE SERPL-MCNC: 174 MG/DL — HIGH (ref 70–115)
HCO3 BLDA-SCNC: 36 MMOL/L — HIGH (ref 20–26)
HCT VFR BLD CALC: 27.6 % — LOW (ref 42–52)
HGB BLD-MCNC: 8.2 G/DL — LOW (ref 14–18)
HOROWITZ INDEX BLDA+IHG-RTO: SIGNIFICANT CHANGE UP
INR BLD: 1.31 RATIO — HIGH (ref 0.88–1.16)
MAGNESIUM SERPL-MCNC: 2.4 MG/DL — SIGNIFICANT CHANGE UP (ref 1.8–2.5)
MCHC RBC-ENTMCNC: 25.8 PG — LOW (ref 27–31)
MCHC RBC-ENTMCNC: 29.7 G/DL — LOW (ref 32–36)
MCV RBC AUTO: 86.8 FL — SIGNIFICANT CHANGE UP (ref 80–94)
METHOD TYPE: SIGNIFICANT CHANGE UP
METHOD TYPE: SIGNIFICANT CHANGE UP
ORGANISM # SPEC MICROSCOPIC CNT: SIGNIFICANT CHANGE UP
PCO2 BLDA: 63 MMHG — HIGH (ref 35–45)
PH BLDA: 7.4 — SIGNIFICANT CHANGE UP (ref 7.35–7.45)
PHOSPHATE SERPL-MCNC: 4.6 MG/DL — SIGNIFICANT CHANGE UP (ref 2.4–4.7)
PLATELET # BLD AUTO: 253 K/UL — SIGNIFICANT CHANGE UP (ref 150–400)
PO2 BLDA: 74 MMHG — LOW (ref 83–108)
POTASSIUM SERPL-MCNC: 4.6 MMOL/L — SIGNIFICANT CHANGE UP (ref 3.5–5.3)
POTASSIUM SERPL-SCNC: 4.6 MMOL/L — SIGNIFICANT CHANGE UP (ref 3.5–5.3)
PROTHROM AB SERPL-ACNC: 14.5 SEC — HIGH (ref 9.8–12.7)
RBC # BLD: 3.18 M/UL — LOW (ref 4.6–6.2)
RBC # FLD: 14.9 % — SIGNIFICANT CHANGE UP (ref 11–15.6)
SAO2 % BLDA: 96 % — SIGNIFICANT CHANGE UP (ref 95–99)
SODIUM SERPL-SCNC: 143 MMOL/L — SIGNIFICANT CHANGE UP (ref 135–145)
SPECIMEN SOURCE: SIGNIFICANT CHANGE UP
WBC # BLD: 16.5 K/UL — HIGH (ref 4.8–10.8)
WBC # FLD AUTO: 16.5 K/UL — HIGH (ref 4.8–10.8)

## 2017-04-12 PROCEDURE — 99233 SBSQ HOSP IP/OBS HIGH 50: CPT

## 2017-04-12 RX ORDER — IPRATROPIUM/ALBUTEROL SULFATE 18-103MCG
3 AEROSOL WITH ADAPTER (GRAM) INHALATION EVERY 6 HOURS
Qty: 0 | Refills: 0 | Status: DISCONTINUED | OUTPATIENT
Start: 2017-04-12 | End: 2017-04-13

## 2017-04-12 RX ORDER — VANCOMYCIN HCL 1 G
VIAL (EA) INTRAVENOUS
Qty: 0 | Refills: 0 | Status: DISCONTINUED | OUTPATIENT
Start: 2017-04-12 | End: 2017-04-14

## 2017-04-12 RX ORDER — WARFARIN SODIUM 2.5 MG/1
10 TABLET ORAL ONCE
Qty: 0 | Refills: 0 | Status: COMPLETED | OUTPATIENT
Start: 2017-04-12 | End: 2017-04-12

## 2017-04-12 RX ORDER — PANTOPRAZOLE SODIUM 20 MG/1
40 TABLET, DELAYED RELEASE ORAL
Qty: 0 | Refills: 0 | Status: DISCONTINUED | OUTPATIENT
Start: 2017-04-12 | End: 2017-04-14

## 2017-04-12 RX ORDER — VANCOMYCIN HCL 1 G
750 VIAL (EA) INTRAVENOUS EVERY 24 HOURS
Qty: 0 | Refills: 0 | Status: DISCONTINUED | OUTPATIENT
Start: 2017-04-13 | End: 2017-04-14

## 2017-04-12 RX ORDER — VANCOMYCIN HCL 1 G
750 VIAL (EA) INTRAVENOUS ONCE
Qty: 0 | Refills: 0 | Status: COMPLETED | OUTPATIENT
Start: 2017-04-12 | End: 2017-04-12

## 2017-04-12 RX ORDER — FUROSEMIDE 40 MG
40 TABLET ORAL ONCE
Qty: 0 | Refills: 0 | Status: COMPLETED | OUTPATIENT
Start: 2017-04-12 | End: 2017-04-12

## 2017-04-12 RX ADMIN — Medication 240 MILLIGRAM(S): at 05:11

## 2017-04-12 RX ADMIN — WARFARIN SODIUM 10 MILLIGRAM(S): 2.5 TABLET ORAL at 23:44

## 2017-04-12 RX ADMIN — Medication 50 MILLIGRAM(S): at 23:45

## 2017-04-12 RX ADMIN — Medication 100 MILLIGRAM(S): at 13:23

## 2017-04-12 RX ADMIN — Medication 40 MILLIGRAM(S): at 05:09

## 2017-04-12 RX ADMIN — CEFTRIAXONE 100 GRAM(S): 500 INJECTION, POWDER, FOR SOLUTION INTRAMUSCULAR; INTRAVENOUS at 13:23

## 2017-04-12 RX ADMIN — Medication 40 MILLIGRAM(S): at 05:10

## 2017-04-12 RX ADMIN — Medication 100 MILLIGRAM(S): at 23:45

## 2017-04-12 RX ADMIN — Medication 150 MILLIGRAM(S): at 15:02

## 2017-04-12 RX ADMIN — Medication 3 MILLILITER(S): at 21:16

## 2017-04-12 RX ADMIN — PANTOPRAZOLE SODIUM 40 MILLIGRAM(S): 20 TABLET, DELAYED RELEASE ORAL at 05:08

## 2017-04-12 RX ADMIN — Medication 3 MILLILITER(S): at 08:25

## 2017-04-12 RX ADMIN — Medication 40 MILLIGRAM(S): at 01:06

## 2017-04-12 RX ADMIN — AZITHROMYCIN 255 MILLIGRAM(S): 500 TABLET, FILM COATED ORAL at 19:50

## 2017-04-12 RX ADMIN — Medication 3 MILLILITER(S): at 03:14

## 2017-04-12 RX ADMIN — Medication 3 MILLILITER(S): at 14:58

## 2017-04-12 RX ADMIN — Medication 50 MILLIGRAM(S): at 05:11

## 2017-04-12 RX ADMIN — POLYETHYLENE GLYCOL 3350 17 GRAM(S): 17 POWDER, FOR SOLUTION ORAL at 05:11

## 2017-04-12 RX ADMIN — PANTOPRAZOLE SODIUM 40 MILLIGRAM(S): 20 TABLET, DELAYED RELEASE ORAL at 17:35

## 2017-04-12 RX ADMIN — POLYETHYLENE GLYCOL 3350 17 GRAM(S): 17 POWDER, FOR SOLUTION ORAL at 17:35

## 2017-04-12 RX ADMIN — Medication 50 MILLIGRAM(S): at 13:24

## 2017-04-12 NOTE — PROGRESS NOTE ADULT - SUBJECTIVE AND OBJECTIVE BOX
Responded to RNs request and evaluated patient due to transient confusion, climbing out of bed, pulling out IV line and yanking on his indwelling Magdaleno cath. Spoke to wife who admits nighttime confusion and forgetfulness Patient alert and oriented to self and time. After brief periods of rest he reports being "tired and exhausted" and again falls right away asleep.  His sleep cycles last for about ten minutes.   VSS and O2 sat 94-96% on 3L NC.     A/P  Patient with COPD, compensated chronic Resp. 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 In the meantime, we will place patient on Constant Observation for safety. Case d/w RN in charge. Please, evaluate need for BIPAP during daytime.

## 2017-04-12 NOTE — PROGRESS NOTE ADULT - ASSESSMENT
Obesity/ZENIA/OHS/COPD - overlap,   CHFpEF,Diastolic dysfunction, severely  enlarged left atrium/r atrium, mod AS (11/16)  Home 02 depd, non compliant with cpap    No evidence CHF on c-xray  AECOPD slowly improving but still bronchospastic

## 2017-04-12 NOTE — PROGRESS NOTE ADULT - SUBJECTIVE AND OBJECTIVE BOX
is an 86 y M hx afib on coumadin, COPD on 2L home O2, neurogenic bladder w chronic indwelling cathether, CHF, anemia, presented to ER c/o several day hx worsening SOB, LE edema since past 2 days. He noticed BRBPR upon wiping.     He appears to be feeling better but has a hx of UTIs and he does have leukocytosis. His urine culture has resulted with two bugs and he was already started on rocephin yesterday and today, I'm starting vancomycin.  He looks clinically comfortable and I think that he had some hemorrhoidal bleeding on coumadin. He denies hx of stroke or falls, and for now, I'm going to resume his coumadin. His hemoglobin appears stable and there are no signs of major bleeding.      He tolerated coumadin last night, i'm continuing at 10mg tonight.    Summary:   REVIEW OF SYSTEMS    General: "I feel good."	    Skin/Breast:  	  Ophthalmologic:  	  ENMT:	    Respiratory and Thorax:  	  Cardiovascular:	    Gastrointestinal:	    Genitourinary:	    Musculoskeletal:	    Neurological:	    Psychiatric:	    Hematology/Lymphatics:	    Endocrine:	    Allergic/Immunologic:	  Vital Signs Last 24 Hrs  T(C): 36.8, Max: 36.8 (04-10 @ 18:17)  T(F): 98.3, Max: 98.3 (04-10 @ 18:17)  HR: 106 (70 - 106)  BP: 130/80 (130/80 - 162/98)  BP(mean): --  RR: 18 (18 - 22)  SpO2: 98% (93% - 99%)  PHYSICAL EXAM:  GEN: NAD, obese, sitting up in a chair, pleasant, talkative, comfortable  HEENT: MMM  CVS: S1S2 RRR  PULM: CTABL, good breath sounds  ABD: soft, nontender, no rebound, no guarding, obese  EXTREM: no edema  NEURO: intact  PSYCH  SKIN:                          9.1    15.2  )-----------( 262      ( 11 Apr 2017 09:23 )             30.9     04-11    143  |  99  |  37.0<H>  ----------------------------<  165<H>  4.5   |  38.0<H>  |  1.73<H>    Ca    8.3<L>      11 Apr 2017 04:05    TPro  6.2<L>  /  Alb  3.8  /  TBili  0.2<L>  /  DBili  x   /  AST  19  /  ALT  19  /  AlkPhos  64  04-10    LIVER FUNCTIONS - ( 10 Apr 2017 13:47 )  Alb: 3.8 g/dL / Pro: 6.2 g/dL / ALK PHOS: 64 U/L / ALT: 19 U/L / AST: 19 U/L / GGT: x           PT/INR - ( 11 Apr 2017 04:05 )   PT: 23.1 sec;   INR: 2.07 ratio         PTT - ( 10 Apr 2017 13:47 )  PTT:37.4 sec    Radiology:     MEDICATIONS  (STANDING):  ALBUTerol/ipratropium for Nebulization 3milliLiter(s) Nebulizer every 6 hours  pantoprazole  Injectable 40milliGRAM(s) IV Push two times a day  diltiazem   CD 240milliGRAM(s) Oral daily  allopurinol 100milliGRAM(s) Oral daily  polyethylene glycol 3350 17Gram(s) Oral two times a day  docusate sodium 100milliGRAM(s) Oral at bedtime  azithromycin  IVPB  IV Intermittent   azithromycin  IVPB 500milliGRAM(s) IV Intermittent every 24 hours  hydrALAZINE 50milliGRAM(s) Oral every 8 hours  methylPREDNISolone sodium succinate Injectable 40milliGRAM(s) IV Push every 12 hours  furosemide   Injectable 40milliGRAM(s) IV Push daily  cefTRIAXone   IVPB  IV Intermittent   warfarin 7milliGRAM(s) Oral once    MEDICATIONS  (PRN):

## 2017-04-12 NOTE — PROGRESS NOTE ADULT - SUBJECTIVE AND OBJECTIVE BOX
PULMONARY PROGRESS NOTE      [id number]    Patient is a 86y old  Male who presents with a chief complaint of SOB, LE edema (10 Apr 2017 15:13)      INTERVAL HPI/OVERNIGHT EVENTS:  Resting comfortably in bed. Still wheezing. No sputum or pain    MEDICATIONS  (STANDING):  ALBUTerol/ipratropium for Nebulization 3milliLiter(s) Nebulizer every 6 hours  pantoprazole  Injectable 40milliGRAM(s) IV Push two times a day  diltiazem   CD 240milliGRAM(s) Oral daily  allopurinol 100milliGRAM(s) Oral daily  polyethylene glycol 3350 17Gram(s) Oral two times a day  docusate sodium 100milliGRAM(s) Oral at bedtime  azithromycin  IVPB  IV Intermittent   azithromycin  IVPB 500milliGRAM(s) IV Intermittent every 24 hours  hydrALAZINE 50milliGRAM(s) Oral every 8 hours  methylPREDNISolone sodium succinate Injectable 40milliGRAM(s) IV Push every 12 hours  furosemide   Injectable 40milliGRAM(s) IV Push daily  cefTRIAXone   IVPB 1Gram(s) IV Intermittent every 24 hours  cefTRIAXone   IVPB  IV Intermittent   warfarin 10milliGRAM(s) Oral once      MEDICATIONS  (PRN):  ALBUTerol/ipratropium for Nebulization 3milliLiter(s) Nebulizer every 6 hours PRN Shortness of Breath and/or Wheezing      Allergies    Ambien (Unknown)  Celebrex (Unknown)  Levaquin (Unknown)  Spiriva (Unknown)    Intolerances        PAST MEDICAL & SURGICAL HISTORY:  Urinary retention  A-fib  COPD (chronic obstructive pulmonary disease)  HTN (hypertension)  CHF (congestive heart failure)  No pertinent past medical history  History of hip surgery: Right hip and knee replacement  No significant past surgical history      SOCIAL HISTORY  Smoking History:       REVIEW OF SYSTEMS:    CONSTITUTIONAL:  No distress    HEENT:  Eyes:  No diplopia or blurred vision. ENT:  No earache, sore throat or runny nose.    CARDIOVASCULAR:  No pressure, squeezing, tightness, heaviness or aching about the chest; no palpitations.    RESPIRATORY:  above    GASTROINTESTINAL:  No nausea, vomiting or diarrhea.    GENITOURINARY:  No dysuria, frequency or urgency.    NEUROLOGIC:  No paresthesias, fasciculations, seizures or weakness.    Extremities: No cyanosis, clubbing or edema    PSYCHIATRIC:  No disorder of thought or mood.    Vital Signs Last 24 Hrs  T(C): 37.1, Max: 37.1 ( @ 05:03)  T(F): 98.7, Max: 98.7 ( @ 05:03)  HR: 96 (95 - 108)  BP: 152/70 (108/60 - 152/70)  BP(mean): --  RR: 18 (18 - 20)  SpO2: 99% (95% - 99%)    PHYSICAL EXAMINATION:    GENERAL: The patient is awake and alert in no apparent distress.     HEENT: Head is normocephalic and atraumatic. Extraocular muscles are intact. Mucous membranes are moist.    NECK: Supple.    LUNGS: scattered wheezes butch, no rales, respirations unlabored    HEART: Regular rate and rhythm without murmur.    ABDOMEN: Soft, nontender, and nondistended.      EXTREMITIES: Without any cyanosis, clubbing, rash, lesions or edema.    NEUROLOGIC: Grossly intact.    LABS:                        8.2    16.5  )-----------( 253      ( 2017 06:18 )             27.6     04-12    143  |  95<L>  |  53.0<H>  ----------------------------<  174<H>  4.6   |  39.0<H>  |  1.80<H>    Ca    8.6      2017 06:18  Phos  4.6     04-12  Mg     2.4     -12    TPro  6.2<L>  /  Alb  3.8  /  TBili  0.2<L>  /  DBili  x   /  AST  19  /  ALT  19  /  AlkPhos  64  04-10    PT/INR - ( 2017 06:18 )   PT: 14.5 sec;   INR: 1.31 ratio         PTT - ( 10 Apr 2017 13:47 )  PTT:37.4 sec  Urinalysis Basic - ( 10 Apr 2017 17:37 )    Color: Yellow / Appearance: Clear / S.010 / pH: x  Gluc: x / Ketone: Negative  / Bili: Negative / Urobili: Negative mg/dL   Blood: x / Protein: Negative mg/dL / Nitrite: Negative   Leuk Esterase: Moderate / RBC: 0-2 /HPF / WBC 6-10   Sq Epi: x / Non Sq Epi: Occasional / Bacteria: Few      ABG - ( 2017 03:59 )  pH: 7.40  /  pCO2: 63    /  pO2: 74    / HCO3: 36    / Base Excess: 12.5  /  SaO2: 96                CARDIAC MARKERS ( 10 Apr 2017 13:47 )  x     / 0.04 ng/mL / x     / x     / x            Serum Pro-Brain Natriuretic Peptide: 1865 pg/mL (04-10 @ 13:47)          MICROBIOLOGY:    RADIOLOGY & ADDITIONAL STUDIES:     EXAM:  US DPLX LWR EXT VEINS COMPL BI                          PROCEDURE DATE:  2017        INTERPRETATION:  HISTORY:  Shortness of breath.      TECHNIQUE:  Ultrasound of the bilateral lower extremities was performed   utilizing real time gray scale, color Doppler and augmentation technique.    COMPARISON EXAMINATION: No prior exam.      FINDINGS:  The bilateral common femoral veins, superficial femoral veins   and popliteal veins demonstrate normal color Doppler flow,   compressibility,and augmentation.    The calf veins are patent, demonstrating normal compressibility.    IMPRESSION: No evidence of deep venous thrombosis of either lower   extremity.      KEN MAYERS M.D., ATTENDING RADIOLOGIST  This document has beenelectronically signed. 2017  2:36PM     EXAM:  CHEST SINGLE VIEW FRONTAL                          PROCEDURE DATE:  04/10/2017        INTERPRETATION:  History: dyspnea    Technique:  AP portable    Comparisons:  Chest x-ray dated 2016    Findings: The lungs are clear. There are no infiltrates, congestion or   pleural effusions. Pulmonary vasculature is normal. Aortic ectasia..   Cardiomegaly unchanged.. The thorax is normal for age.    Impression: No acute pulmonary disease. Cardiomegaly.    No interval change        RAÚL GRAYSON M.D., ATTENDING RADIOLOGIST  This document has been electronically signed. Apr 10 2017  1:26PM

## 2017-04-13 DIAGNOSIS — J44.1 CHRONIC OBSTRUCTIVE PULMONARY DISEASE WITH (ACUTE) EXACERBATION: ICD-10-CM

## 2017-04-13 LAB
ANION GAP SERPL CALC-SCNC: 7 MMOL/L — SIGNIFICANT CHANGE UP (ref 5–17)
BUN SERPL-MCNC: 50 MG/DL — HIGH (ref 8–20)
CALCIUM SERPL-MCNC: 8.5 MG/DL — LOW (ref 8.6–10.2)
CHLORIDE SERPL-SCNC: 96 MMOL/L — LOW (ref 98–107)
CO2 SERPL-SCNC: 42 MMOL/L — HIGH (ref 22–29)
CREAT SERPL-MCNC: 1.68 MG/DL — HIGH (ref 0.5–1.3)
GLUCOSE SERPL-MCNC: 138 MG/DL — HIGH (ref 70–115)
HCT VFR BLD CALC: 28.1 % — LOW (ref 42–52)
HGB BLD-MCNC: 8.4 G/DL — LOW (ref 14–18)
INR BLD: 1.36 RATIO — HIGH (ref 0.88–1.16)
MAGNESIUM SERPL-MCNC: 2.6 MG/DL — HIGH (ref 1.8–2.5)
MCHC RBC-ENTMCNC: 26.3 PG — LOW (ref 27–31)
MCHC RBC-ENTMCNC: 29.9 G/DL — LOW (ref 32–36)
MCV RBC AUTO: 87.8 FL — SIGNIFICANT CHANGE UP (ref 80–94)
PHOSPHATE SERPL-MCNC: 4.6 MG/DL — SIGNIFICANT CHANGE UP (ref 2.4–4.7)
PLATELET # BLD AUTO: 235 K/UL — SIGNIFICANT CHANGE UP (ref 150–400)
POTASSIUM SERPL-MCNC: 4.2 MMOL/L — SIGNIFICANT CHANGE UP (ref 3.5–5.3)
POTASSIUM SERPL-SCNC: 4.2 MMOL/L — SIGNIFICANT CHANGE UP (ref 3.5–5.3)
PROTHROM AB SERPL-ACNC: 15 SEC — HIGH (ref 9.8–12.7)
RBC # BLD: 3.2 M/UL — LOW (ref 4.6–6.2)
RBC # FLD: 15 % — SIGNIFICANT CHANGE UP (ref 11–15.6)
SODIUM SERPL-SCNC: 145 MMOL/L — SIGNIFICANT CHANGE UP (ref 135–145)
WBC # BLD: 13 K/UL — HIGH (ref 4.8–10.8)
WBC # FLD AUTO: 13 K/UL — HIGH (ref 4.8–10.8)

## 2017-04-13 PROCEDURE — 71010: CPT | Mod: 26

## 2017-04-13 PROCEDURE — 99232 SBSQ HOSP IP/OBS MODERATE 35: CPT

## 2017-04-13 RX ORDER — FUROSEMIDE 40 MG
40 TABLET ORAL DAILY
Qty: 0 | Refills: 0 | Status: DISCONTINUED | OUTPATIENT
Start: 2017-04-14 | End: 2017-04-14

## 2017-04-13 RX ORDER — WARFARIN SODIUM 2.5 MG/1
10 TABLET ORAL ONCE
Qty: 0 | Refills: 0 | Status: COMPLETED | OUTPATIENT
Start: 2017-04-13 | End: 2017-04-13

## 2017-04-13 RX ADMIN — Medication 40 MILLIGRAM(S): at 05:07

## 2017-04-13 RX ADMIN — Medication 50 MILLIGRAM(S): at 05:06

## 2017-04-13 RX ADMIN — Medication 3 MILLILITER(S): at 08:38

## 2017-04-13 RX ADMIN — Medication 3 MILLILITER(S): at 21:35

## 2017-04-13 RX ADMIN — Medication 150 MILLIGRAM(S): at 13:43

## 2017-04-13 RX ADMIN — Medication 100 MILLIGRAM(S): at 13:43

## 2017-04-13 RX ADMIN — CEFTRIAXONE 100 GRAM(S): 500 INJECTION, POWDER, FOR SOLUTION INTRAMUSCULAR; INTRAVENOUS at 13:43

## 2017-04-13 RX ADMIN — PANTOPRAZOLE SODIUM 40 MILLIGRAM(S): 20 TABLET, DELAYED RELEASE ORAL at 17:06

## 2017-04-13 RX ADMIN — Medication 50 MILLIGRAM(S): at 13:43

## 2017-04-13 RX ADMIN — PANTOPRAZOLE SODIUM 40 MILLIGRAM(S): 20 TABLET, DELAYED RELEASE ORAL at 05:08

## 2017-04-13 RX ADMIN — Medication 100 MILLIGRAM(S): at 23:13

## 2017-04-13 RX ADMIN — WARFARIN SODIUM 10 MILLIGRAM(S): 2.5 TABLET ORAL at 23:14

## 2017-04-13 RX ADMIN — Medication 50 MILLIGRAM(S): at 23:14

## 2017-04-13 RX ADMIN — POLYETHYLENE GLYCOL 3350 17 GRAM(S): 17 POWDER, FOR SOLUTION ORAL at 05:07

## 2017-04-13 RX ADMIN — Medication 3 MILLILITER(S): at 03:23

## 2017-04-13 RX ADMIN — AZITHROMYCIN 255 MILLIGRAM(S): 500 TABLET, FILM COATED ORAL at 20:40

## 2017-04-13 RX ADMIN — Medication 240 MILLIGRAM(S): at 05:06

## 2017-04-13 RX ADMIN — Medication 3 MILLILITER(S): at 14:52

## 2017-04-13 NOTE — PROGRESS NOTE ADULT - SUBJECTIVE AND OBJECTIVE BOX
PULMONARY PROGRESS NOTE      [id number]    Patient is a 86y old  Male who presents with a chief complaint of SOB, LE edema (10 Apr 2017 15:13)      INTERVAL HPI/OVERNIGHT EVENTS:  feels near baseline, anxious for dc. Minimal wheeze    MEDICATIONS  (STANDING):  ALBUTerol/ipratropium for Nebulization 3milliLiter(s) Nebulizer every 6 hours  diltiazem   CD 240milliGRAM(s) Oral daily  allopurinol 100milliGRAM(s) Oral daily  polyethylene glycol 3350 17Gram(s) Oral two times a day  docusate sodium 100milliGRAM(s) Oral at bedtime  azithromycin  IVPB  IV Intermittent   azithromycin  IVPB 500milliGRAM(s) IV Intermittent every 24 hours  hydrALAZINE 50milliGRAM(s) Oral every 8 hours  furosemide   Injectable 40milliGRAM(s) IV Push daily  cefTRIAXone   IVPB 1Gram(s) IV Intermittent every 24 hours  cefTRIAXone   IVPB  IV Intermittent   vancomycin  IVPB  IV Intermittent   pantoprazole    Tablet 40milliGRAM(s) Oral two times a day before meals  vancomycin  IVPB 750milliGRAM(s) IV Intermittent every 24 hours  predniSONE   Tablet 40milliGRAM(s) Oral daily  warfarin 10milliGRAM(s) Oral once      MEDICATIONS  (PRN):  ALBUTerol/ipratropium for Nebulization 3milliLiter(s) Nebulizer every 6 hours PRN Shortness of Breath and/or Wheezing      Allergies    Ambien (Unknown)  Celebrex (Unknown)  Levaquin (Unknown)  Spiriva (Unknown)    Intolerances        PAST MEDICAL & SURGICAL HISTORY:  Urinary retention  A-fib  COPD (chronic obstructive pulmonary disease)  HTN (hypertension)  CHF (congestive heart failure)  No pertinent past medical history  History of hip surgery: Right hip and knee replacement  No significant past surgical history      SOCIAL HISTORY  Smoking History:       REVIEW OF SYSTEMS:    CONSTITUTIONAL:  No distress    HEENT:  Eyes:  No diplopia or blurred vision. ENT:  No earache, sore throat or runny nose.    CARDIOVASCULAR:  No pressure, squeezing, tightness, heaviness or aching about the chest; no palpitations.    RESPIRATORY:  above    GASTROINTESTINAL:  No nausea, vomiting or diarrhea.    GENITOURINARY:  No dysuria, frequency or urgency.    NEUROLOGIC:  No paresthesias, fasciculations, seizures or weakness.    Extremities: No cyanosis, clubbing or edema    PSYCHIATRIC:  No disorder of thought or mood.    Vital Signs Last 24 Hrs  T(C): 36.7, Max: 37.6 (04-12 @ 23:36)  T(F): 98, Max: 99.7 (04-12 @ 23:36)  HR: 111 (95 - 111)  BP: 132/68 (132/68 - 148/62)  BP(mean): --  RR: 18 (18 - 19)  SpO2: 98% (92% - 100%)    PHYSICAL EXAMINATION:    GENERAL: The patient is awake and alert in no apparent distress.     HEENT: Head is normocephalic and atraumatic. Extraocular muscles are intact. Mucous membranes are moist.    NECK: Supple.    LUNGS: Clear to auscultation with minimal end exp wheeze; respirations unlabored    HEART: Regular rate and rhythm without murmur.    ABDOMEN: Soft, nontender, and nondistended.      EXTREMITIES: Without any cyanosis, clubbing, rash, lesions or edema.    NEUROLOGIC: Grossly intact.    LABS:                        8.4    13.0  )-----------( 235      ( 13 Apr 2017 07:22 )             28.1     04-13    145  |  96<L>  |  50.0<H>  ----------------------------<  138<H>  4.2   |  42.0<H>  |  1.68<H>    Ca    8.5<L>      13 Apr 2017 07:22  Phos  4.6     04-13  Mg     2.6     04-13      PT/INR - ( 13 Apr 2017 07:22 )   PT: 15.0 sec;   INR: 1.36 ratio             ABG - ( 12 Apr 2017 03:59 )  pH: 7.40  /  pCO2: 63    /  pO2: 74    / HCO3: 36    / Base Excess: 12.5  /  SaO2: 96                      Serum Pro-Brain Natriuretic Peptide: 1865 pg/mL (04-10 @ 13:47)          MICROBIOLOGY:    RADIOLOGY & ADDITIONAL STUDIES:

## 2017-04-13 NOTE — PROGRESS NOTE ADULT - SUBJECTIVE AND OBJECTIVE BOX
is an 86 y M hx afib on coumadin, COPD on 2L home O2, neurogenic bladder w chronic indwelling cathether, CHF, anemia, presented to ER c/o several day hx worsening SOB, LE edema since past 2 days. He noticed BRBPR upon wiping.     He appears to be feeling better but has a hx of UTIs and he does have leukocytosis. His urine culture has resulted with two bugs and he was already started on rocephin and vancomycin.  He looks clinically comfortable and I think that he had some hemorrhoidal bleeding on coumadin. He denies hx of stroke or falls, and for now, I'm going to resume his coumadin. His hemoglobin appears stable and there are no signs of major bleeding.      He tolerated coumadin last night, I'm continuing at 10mg tonight, waiting for his INR to increase. Today is day 2 of UTI treatment and his leukocytosis is decreasing. I'm also waiting for his pulmonary edema to improve, and I've changed his lasix to po Qdaily and am ordering a CXR. He wants to go home, and I'll try to arrange his d/c home tomm.     Summary:   REVIEW OF SYSTEMS    General: "I feel good."	    Skin/Breast:  	  Ophthalmologic:  	  ENMT:	    Respiratory and Thorax:  	  Cardiovascular:	    Gastrointestinal:	    Genitourinary:	    Musculoskeletal:	    Neurological:	    Psychiatric:	    Hematology/Lymphatics:	    Endocrine:	    Allergic/Immunologic:	  Vital Signs Last 24 Hrs  110/68, HR= 110  PHYSICAL EXAM:  GEN: NAD, obese, sitting up in a chair, pleasant, talkative, comfortable  HEENT: MMM  CVS: S1S2 RRR  PULM: CTABL, good breath sounds  ABD: soft, nontender, no rebound, no guarding, obese  EXTREM: no edema  NEURO: intact  PSYCH  SKIN:                          9.1    15.2  )-----------( 262      ( 11 Apr 2017 09:23 )             30.9     04-11    143  |  99  |  37.0<H>  ----------------------------<  165<H>  4.5   |  38.0<H>  |  1.73<H>    Ca    8.3<L>      11 Apr 2017 04:05    TPro  6.2<L>  /  Alb  3.8  /  TBili  0.2<L>  /  DBili  x   /  AST  19  /  ALT  19  /  AlkPhos  64  04-10    LIVER FUNCTIONS - ( 10 Apr 2017 13:47 )  Alb: 3.8 g/dL / Pro: 6.2 g/dL / ALK PHOS: 64 U/L / ALT: 19 U/L / AST: 19 U/L / GGT: x           PT/INR - ( 11 Apr 2017 04:05 )   PT: 23.1 sec;   INR: 2.07 ratio         PTT - ( 10 Apr 2017 13:47 )  PTT:37.4 sec    Radiology:     MEDICATIONS  (STANDING):  ALBUTerol/ipratropium for Nebulization 3milliLiter(s) Nebulizer every 6 hours  pantoprazole  Injectable 40milliGRAM(s) IV Push two times a day  diltiazem   CD 240milliGRAM(s) Oral daily  allopurinol 100milliGRAM(s) Oral daily  polyethylene glycol 3350 17Gram(s) Oral two times a day  docusate sodium 100milliGRAM(s) Oral at bedtime  azithromycin  IVPB  IV Intermittent   azithromycin  IVPB 500milliGRAM(s) IV Intermittent every 24 hours  hydrALAZINE 50milliGRAM(s) Oral every 8 hours  methylPREDNISolone sodium succinate Injectable 40milliGRAM(s) IV Push every 12 hours  furosemide   Injectable 40milliGRAM(s) IV Push daily  cefTRIAXone   IVPB  IV Intermittent   warfarin 7milliGRAM(s) Oral once    MEDICATIONS  (PRN):

## 2017-04-14 ENCOUNTER — TRANSCRIPTION ENCOUNTER (OUTPATIENT)
Age: 82
End: 2017-04-14

## 2017-04-14 VITALS
HEART RATE: 100 BPM | RESPIRATION RATE: 18 BRPM | SYSTOLIC BLOOD PRESSURE: 102 MMHG | DIASTOLIC BLOOD PRESSURE: 54 MMHG | OXYGEN SATURATION: 98 %

## 2017-04-14 LAB
ANION GAP SERPL CALC-SCNC: <4 MMOL/L — LOW (ref 5–17)
BUN SERPL-MCNC: 48 MG/DL — HIGH (ref 8–20)
CALCIUM SERPL-MCNC: 8.3 MG/DL — LOW (ref 8.6–10.2)
CHLORIDE SERPL-SCNC: 96 MMOL/L — LOW (ref 98–107)
CO2 SERPL-SCNC: >43 MMOL/L — HIGH (ref 22–29)
CREAT SERPL-MCNC: 1.41 MG/DL — HIGH (ref 0.5–1.3)
GLUCOSE SERPL-MCNC: 109 MG/DL — SIGNIFICANT CHANGE UP (ref 70–115)
HCT VFR BLD CALC: 27.2 % — LOW (ref 42–52)
HGB BLD-MCNC: 8.1 G/DL — LOW (ref 14–18)
INR BLD: 2 RATIO — HIGH (ref 0.88–1.16)
MAGNESIUM SERPL-MCNC: 2.7 MG/DL — HIGH (ref 1.8–2.5)
MCHC RBC-ENTMCNC: 26.3 PG — LOW (ref 27–31)
MCHC RBC-ENTMCNC: 29.8 G/DL — LOW (ref 32–36)
MCV RBC AUTO: 88.3 FL — SIGNIFICANT CHANGE UP (ref 80–94)
PHOSPHATE SERPL-MCNC: 3.6 MG/DL — SIGNIFICANT CHANGE UP (ref 2.4–4.7)
PLATELET # BLD AUTO: 230 K/UL — SIGNIFICANT CHANGE UP (ref 150–400)
POTASSIUM SERPL-MCNC: 3.9 MMOL/L — SIGNIFICANT CHANGE UP (ref 3.5–5.3)
POTASSIUM SERPL-SCNC: 3.9 MMOL/L — SIGNIFICANT CHANGE UP (ref 3.5–5.3)
PROTHROM AB SERPL-ACNC: 22.3 SEC — HIGH (ref 9.8–12.7)
RBC # BLD: 3.08 M/UL — LOW (ref 4.6–6.2)
RBC # FLD: 14.6 % — SIGNIFICANT CHANGE UP (ref 11–15.6)
SODIUM SERPL-SCNC: 143 MMOL/L — SIGNIFICANT CHANGE UP (ref 135–145)
WBC # BLD: 10.9 K/UL — HIGH (ref 4.8–10.8)
WBC # FLD AUTO: 10.9 K/UL — HIGH (ref 4.8–10.8)

## 2017-04-14 PROCEDURE — 85027 COMPLETE CBC AUTOMATED: CPT

## 2017-04-14 PROCEDURE — 83735 ASSAY OF MAGNESIUM: CPT

## 2017-04-14 PROCEDURE — 97163 PT EVAL HIGH COMPLEX 45 MIN: CPT

## 2017-04-14 PROCEDURE — 99232 SBSQ HOSP IP/OBS MODERATE 35: CPT

## 2017-04-14 PROCEDURE — 82272 OCCULT BLD FECES 1-3 TESTS: CPT

## 2017-04-14 PROCEDURE — 80053 COMPREHEN METABOLIC PANEL: CPT

## 2017-04-14 PROCEDURE — 86901 BLOOD TYPING SEROLOGIC RH(D): CPT

## 2017-04-14 PROCEDURE — 83880 ASSAY OF NATRIURETIC PEPTIDE: CPT

## 2017-04-14 PROCEDURE — 94640 AIRWAY INHALATION TREATMENT: CPT

## 2017-04-14 PROCEDURE — 99285 EMERGENCY DEPT VISIT HI MDM: CPT | Mod: 25

## 2017-04-14 PROCEDURE — 85730 THROMBOPLASTIN TIME PARTIAL: CPT

## 2017-04-14 PROCEDURE — 84100 ASSAY OF PHOSPHORUS: CPT

## 2017-04-14 PROCEDURE — 87186 SC STD MICRODIL/AGAR DIL: CPT

## 2017-04-14 PROCEDURE — 84484 ASSAY OF TROPONIN QUANT: CPT

## 2017-04-14 PROCEDURE — 87086 URINE CULTURE/COLONY COUNT: CPT

## 2017-04-14 PROCEDURE — 86900 BLOOD TYPING SEROLOGIC ABO: CPT

## 2017-04-14 PROCEDURE — 71045 X-RAY EXAM CHEST 1 VIEW: CPT

## 2017-04-14 PROCEDURE — 80048 BASIC METABOLIC PNL TOTAL CA: CPT

## 2017-04-14 PROCEDURE — 36600 WITHDRAWAL OF ARTERIAL BLOOD: CPT

## 2017-04-14 PROCEDURE — 85610 PROTHROMBIN TIME: CPT

## 2017-04-14 PROCEDURE — 86850 RBC ANTIBODY SCREEN: CPT

## 2017-04-14 PROCEDURE — 93005 ELECTROCARDIOGRAM TRACING: CPT

## 2017-04-14 PROCEDURE — 36415 COLL VENOUS BLD VENIPUNCTURE: CPT

## 2017-04-14 PROCEDURE — 82803 BLOOD GASES ANY COMBINATION: CPT

## 2017-04-14 PROCEDURE — 81001 URINALYSIS AUTO W/SCOPE: CPT

## 2017-04-14 PROCEDURE — 93970 EXTREMITY STUDY: CPT

## 2017-04-14 RX ORDER — HYDRALAZINE HCL 50 MG
1 TABLET ORAL
Qty: 90 | Refills: 0 | OUTPATIENT
Start: 2017-04-14 | End: 2017-05-14

## 2017-04-14 RX ORDER — WARFARIN SODIUM 2.5 MG/1
7.5 TABLET ORAL ONCE
Qty: 0 | Refills: 0 | Status: DISCONTINUED | OUTPATIENT
Start: 2017-04-14 | End: 2017-04-14

## 2017-04-14 RX ORDER — OMEPRAZOLE 10 MG/1
1 CAPSULE, DELAYED RELEASE ORAL
Qty: 30 | Refills: 0 | OUTPATIENT
Start: 2017-04-14 | End: 2017-05-14

## 2017-04-14 RX ORDER — DOCUSATE SODIUM 100 MG
1 CAPSULE ORAL
Qty: 30 | Refills: 0 | OUTPATIENT
Start: 2017-04-14 | End: 2017-05-14

## 2017-04-14 RX ORDER — VANCOMYCIN HCL 1 G
1000 VIAL (EA) INTRAVENOUS
Qty: 0 | Refills: 0 | Status: DISCONTINUED | OUTPATIENT
Start: 2017-04-14 | End: 2017-04-14

## 2017-04-14 RX ORDER — WARFARIN SODIUM 2.5 MG/1
1 TABLET ORAL
Qty: 1 | Refills: 0 | OUTPATIENT
Start: 2017-04-14 | End: 2017-04-15

## 2017-04-14 RX ORDER — FERROUS SULFATE 325(65) MG
5 TABLET ORAL
Qty: 150 | Refills: 0 | OUTPATIENT
Start: 2017-04-14 | End: 2017-05-14

## 2017-04-14 RX ORDER — FUROSEMIDE 40 MG
1 TABLET ORAL
Qty: 10 | Refills: 0 | OUTPATIENT
Start: 2017-04-14 | End: 2017-04-24

## 2017-04-14 RX ADMIN — Medication 40 MILLIGRAM(S): at 06:51

## 2017-04-14 RX ADMIN — PANTOPRAZOLE SODIUM 40 MILLIGRAM(S): 20 TABLET, DELAYED RELEASE ORAL at 06:47

## 2017-04-14 RX ADMIN — Medication 100 MILLIGRAM(S): at 13:10

## 2017-04-14 RX ADMIN — Medication 3 MILLILITER(S): at 02:38

## 2017-04-14 RX ADMIN — AZITHROMYCIN 255 MILLIGRAM(S): 500 TABLET, FILM COATED ORAL at 18:23

## 2017-04-14 RX ADMIN — CEFTRIAXONE 100 GRAM(S): 500 INJECTION, POWDER, FOR SOLUTION INTRAMUSCULAR; INTRAVENOUS at 13:09

## 2017-04-14 RX ADMIN — Medication 250 MILLIGRAM(S): at 13:09

## 2017-04-14 RX ADMIN — Medication 40 MILLIGRAM(S): at 06:47

## 2017-04-14 RX ADMIN — POLYETHYLENE GLYCOL 3350 17 GRAM(S): 17 POWDER, FOR SOLUTION ORAL at 06:47

## 2017-04-14 RX ADMIN — Medication 3 MILLILITER(S): at 13:52

## 2017-04-14 RX ADMIN — Medication 50 MILLIGRAM(S): at 06:47

## 2017-04-14 RX ADMIN — Medication 50 MILLIGRAM(S): at 13:10

## 2017-04-14 RX ADMIN — PANTOPRAZOLE SODIUM 40 MILLIGRAM(S): 20 TABLET, DELAYED RELEASE ORAL at 18:23

## 2017-04-14 RX ADMIN — Medication 240 MILLIGRAM(S): at 06:47

## 2017-04-14 NOTE — DISCHARGE NOTE ADULT - PROVIDER TOKENS
TOKEN:'[id number]',FREE:[LAST:[sagar],PHONE:[(   )    -],FAX:[(   )    -],ADDRESS:[cardiologist]],FREE:[LAST:[aron],PHONE:[(   )    -],FAX:[(   )    -],ADDRESS:[PMD]]

## 2017-04-14 NOTE — PROGRESS NOTE ADULT - PROBLEM SELECTOR PROBLEM 3
Chronic obstructive pulmonary disease with acute exacerbation
Sleep apnea, unspecified type
Chronic obstructive pulmonary disease with acute exacerbation
Chronic obstructive pulmonary disease with acute exacerbation
Sleep apnea, unspecified type

## 2017-04-14 NOTE — DISCHARGE NOTE ADULT - CARE PLAN
Principal Discharge DX:	Acute diastolic congestive heart failure  Goal:	Please take all meds and f/u with cardiologist  and regular PMD  Instructions for follow-up, activity and diet:	Please take all meds and f/u with PMD and cardiologist and regular pulmonologist.  Secondary Diagnosis:	Chronic obstructive pulmonary disease with acute exacerbation

## 2017-04-14 NOTE — PROGRESS NOTE ADULT - PROBLEM SELECTOR PLAN 4
-cont hydralazine
better controlled, continue diltiazem and hydralaznie
diet
-cont hydralazine

## 2017-04-14 NOTE — PROGRESS NOTE ADULT - PROBLEM SELECTOR PROBLEM 4
Essential hypertension
Obesity, unspecified obesity severity, unspecified obesity type
Essential hypertension
Essential hypertension
Obesity, unspecified obesity severity, unspecified obesity type

## 2017-04-14 NOTE — PROGRESS NOTE ADULT - PROBLEM SELECTOR PROBLEM 2
Chronic atrial fibrillation
Hypoxia
Chronic atrial fibrillation
Chronic atrial fibrillation
Hypoxia

## 2017-04-14 NOTE — DISCHARGE NOTE ADULT - HOSPITAL COURSE
is an 86 y M hx afib on coumadin, COPD on 2L home O2, neurogenic bladder w chronic indwelling cathether, CHF, anemia, presented to ER c/o several day hx worsening SOB, LE edema since past 2 days. He noticed BRBPR upon wiping.      He appears to be feeling better but has a hx of UTIs and he does have leukocytosis. He looks clinically comfortable and I think that he had some hemorrhoidal bleeding on coumadin. His urine culture has resulted with two organisms and he was already started on rocephin and vancomycin, with resolving leukocytosis. He did not complain of burning, dysuria and he did not have cystitis on physical exam. Given that he has a chronic adams, the organisms may have been also contaminants but he was treated for 3 days with IV antibiotics with improvement in white count. His hospital course was complicated by acute diastolic heart failure which responded to IV lasix well.  He denies hx of stroke or falls, and for now, I'm going to resume his coumadin. His hemoglobin is stable and he's tolerating couamdin well.     He is doing much better and is comfortable, insisting on "going home today." His lungs sound much improved and I'm discharging him on po lasix with outpatient f/u. His INR is 2.00 today and I'm sending him on 7.5mg of coumadin tonight with resulting 5mg every night thereafter, resuming his regular INR checks.

## 2017-04-14 NOTE — PROGRESS NOTE ADULT - PROBLEM SELECTOR PLAN 2
-cont coumadin at 10mg mg tonight  -f/u PT/INR
-cont coumadin at 7.5 mg mg tonight  -f/u PT/INR
-restart coumadin at 10mg mg tonight  -f/u PT/INR
O2
on CCB rate controlled, continue anticoagulation for now INR 2-3 goal
-restart coumadin at 7mg tonight  -f/u PT/INR

## 2017-04-14 NOTE — DISCHARGE NOTE ADULT - PATIENT PORTAL LINK FT
“You can access the [facility] Patient Portal, offered by [facility], by registering with the following website: [email or web address]”

## 2017-04-14 NOTE — PROGRESS NOTE ADULT - PROBLEM SELECTOR PLAN 5
-cont rocephin  -cont vancomycin   -urine culture demonstrates multiple organisms, including E.faecalis
-cont rocephin  -started vancomycin today  -urine culture demonstrates multiple organisms, including E.faecalis
-cont rocephin (day 3/3)  -cont vancomycin (day 3/3)  -urine culture demonstrates multiple organisms, including E.faecalis  -white count improved and he can be d/cd home
per cardiology
-cont rocephin

## 2017-04-14 NOTE — PROGRESS NOTE ADULT - SUBJECTIVE AND OBJECTIVE BOX
is an 86 y M hx afib on coumadin, COPD on 2L home O2, neurogenic bladder w chronic indwelling cathether, CHF, anemia, presented to ER c/o several day hx worsening SOB, LE edema since past 2 days. He noticed BRBPR upon wiping.     He appears to be feeling better but has a hx of UTIs and he does have leukocytosis. His urine culture has resulted with two bugs and he was already started on rocephin and vancomycin.  He looks clinically comfortable and I think that he had some hemorrhoidal bleeding on coumadin. He denies hx of stroke or falls, and for now, I'm going to resume his coumadin. His hemoglobin appears stable and there are no signs of major bleeding.      He tolerated coumadin last night, I'm continuing at 10mg tonight, waiting for his INR to increase. Today is day 3 of UTI treatment and his leukocytosis is decreasing. His pulmonary edema has improved substantially and he's tolerating coumadin well, and has a therapeutic INR today. He can be discharged home today and f/u with PMD and cardiology as an outpatient.   Summary:   REVIEW OF SYSTEMS    General: "I feel good."	    Skin/Breast:  	  Ophthalmologic:  	  ENMT:	    Respiratory and Thorax:  	  Cardiovascular:	    Gastrointestinal:	    Genitourinary:	    Musculoskeletal:	    Neurological:	    Psychiatric:	    Hematology/Lymphatics:	    Endocrine:	    Allergic/Immunologic:	  Vital Signs Last 24 Hrs  124/72, HR=97   PHYSICAL EXAM:  GEN: NAD, obese, sitting up in a chair, pleasant, talkative, comfortable  HEENT: MMM  CVS: S1S2 RRR  PULM: CTABL, good breath sounds  ABD: soft, nontender, no rebound, no guarding, obese  EXTREM: no edema  NEURO: intact  PSYCH  SKIN:                        9.1    15.2  )-----------( 262      ( 11 Apr 2017 09:23 )             30.9     04-11    143  |  99  |  37.0<H>  ----------------------------<  165<H>  4.5   |  38.0<H>  |  1.73<H>    Ca    8.3<L>      11 Apr 2017 04:05    TPro  6.2<L>  /  Alb  3.8  /  TBili  0.2<L>  /  DBili  x   /  AST  19  /  ALT  19  /  AlkPhos  64  04-10    LIVER FUNCTIONS - ( 10 Apr 2017 13:47 )  Alb: 3.8 g/dL / Pro: 6.2 g/dL / ALK PHOS: 64 U/L / ALT: 19 U/L / AST: 19 U/L / GGT: x           PT/INR - ( 11 Apr 2017 04:05 )   PT: 23.1 sec;   INR: 2.07 ratio         PTT - ( 10 Apr 2017 13:47 )  PTT:37.4 sec    Radiology:     MEDICATIONS  (STANDING):  ALBUTerol/ipratropium for Nebulization 3milliLiter(s) Nebulizer every 6 hours  pantoprazole  Injectable 40milliGRAM(s) IV Push two times a day  diltiazem   CD 240milliGRAM(s) Oral daily  allopurinol 100milliGRAM(s) Oral daily  polyethylene glycol 3350 17Gram(s) Oral two times a day  docusate sodium 100milliGRAM(s) Oral at bedtime  azithromycin  IVPB  IV Intermittent   azithromycin  IVPB 500milliGRAM(s) IV Intermittent every 24 hours  hydrALAZINE 50milliGRAM(s) Oral every 8 hours  methylPREDNISolone sodium succinate Injectable 40milliGRAM(s) IV Push every 12 hours  furosemide   Injectable 40milliGRAM(s) IV Push daily  cefTRIAXone   IVPB  IV Intermittent   warfarin 7milliGRAM(s) Oral once    MEDICATIONS  (PRN):

## 2017-04-14 NOTE — DISCHARGE NOTE ADULT - CARE PROVIDER_API CALL
Pramod Mishra), Critical Care Medicine; Internal Medicine; Pulmonary Disease  [address] [place] [address]  Phone: [phone]  Fax: [phone]    sagar   cardiologist  Phone: (   )    -  Fax: (   )    -    SERGIO sharp  Phone: (   )    -  Fax: (   )    -

## 2017-04-14 NOTE — DISCHARGE NOTE ADULT - MEDICATION SUMMARY - MEDICATIONS TO CHANGE
I will SWITCH the dose or number of times a day I take the medications listed below when I get home from the hospital:    omeprazole  -- 20 milligram(s) by mouth once a day

## 2017-04-14 NOTE — DISCHARGE NOTE ADULT - PLAN OF CARE
Please take all meds and f/u with cardiologist  and regular PMD Please take all meds and f/u with PMD and cardiologist and regular pulmonologist.

## 2017-04-14 NOTE — PROGRESS NOTE ADULT - PROBLEM SELECTOR PLAN 1
-most likely hemorrhoids  -cont senna/colace/miralax
-most likely hemorrhoids  -he complains of constipation currently  -cont senna/colace/miralax
-most likely hemorrhoids  -he complains of constipation currently  -start senna/colace/miralax
can use oral diuretics, lasix 40 mg daily , monitor creatinine
no change in meds
-most likely hemorrhoids  -he complains of constipation currently  -start senna/colace/miralax
taper prednisone over 12 days  Complete ABX  Home O2 if room air sats <88%  Breo 100/25 and Arnuity as outpt or other LAMA/LABA/ICS  Home nebulizer with albuterol  f/u with us in 2wks  recall prn

## 2017-04-14 NOTE — PROGRESS NOTE ADULT - PROBLEM SELECTOR PROBLEM 1
Chronic obstructive pulmonary disease, unspecified COPD type
Gastrointestinal hemorrhage associated with anorectal source
Acute on chronic diastolic heart failure
COPD exacerbation
Gastrointestinal hemorrhage associated with anorectal source
Chronic obstructive pulmonary disease, unspecified COPD type

## 2017-04-14 NOTE — PROGRESS NOTE ADULT - PROBLEM SELECTOR PROBLEM 5
Acute cystitis without hematuria
Chronic diastolic congestive heart failure
Chronic diastolic congestive heart failure
Acute cystitis without hematuria

## 2017-04-15 RX ORDER — WARFARIN SODIUM 2.5 MG/1
1 TABLET ORAL
Qty: 10 | Refills: 0 | OUTPATIENT
Start: 2017-04-15 | End: 2017-04-25

## 2017-07-24 ENCOUNTER — APPOINTMENT (OUTPATIENT)
Dept: PULMONOLOGY | Facility: CLINIC | Age: 82
End: 2017-07-24

## 2017-08-01 ENCOUNTER — MOBILE ON CALL (OUTPATIENT)
Age: 82
End: 2017-08-01

## 2018-01-16 ENCOUNTER — CHART COPY (OUTPATIENT)
Age: 83
End: 2018-01-16

## 2018-06-12 NOTE — DISCHARGE NOTE ADULT - FUNCTIONAL SCREEN CURRENT LEVEL: BATHING, MLM
Normal vision: sees adequately in most situations; can see medication labels, newsprint
(2) assistive person

## 2019-01-25 NOTE — DISCHARGE NOTE ADULT - NS AS DC FU INST LIST INST
Use Enhanced Medication Counseling?: No Isotretinoin Pregnancy And Lactation Text: This medication is Pregnancy Category X and is considered extremely dangerous during pregnancy. It is unknown if it is excreted in breast milk. yes Topical Sulfur Applications Counseling: Topical Sulfur Counseling: Patient counseled that this medication may cause skin irritation or allergic reactions.  In the event of skin irritation, the patient was advised to reduce the amount of the drug applied or use it less frequently.   The patient verbalized understanding of the proper use and possible adverse effects of topical sulfur application.  All of the patient's questions and concerns were addressed. Topical Clindamycin Pregnancy And Lactation Text: This medication is Pregnancy Category B and is considered safe during pregnancy. It is unknown if it is excreted in breast milk. Tazorac Counseling:  Patient advised that medication is irritating and drying.  Patient may need to apply sparingly and wash off after an hour before eventually leaving it on overnight.  The patient verbalized understanding of the proper use and possible adverse effects of tazorac.  All of the patient's questions and concerns were addressed. Azithromycin Counseling:  I discussed with the patient the risks of azithromycin including but not limited to GI upset, allergic reaction, drug rash, diarrhea, and yeast infections. Benzoyl Peroxide Pregnancy And Lactation Text: This medication is Pregnancy Category C. It is unknown if benzoyl peroxide is excreted in breast milk. Benzoyl Peroxide Counseling: Patient counseled that medicine may cause skin irritation and bleach clothing.  In the event of skin irritation, the patient was advised to reduce the amount of the drug applied or use it less frequently.   The patient verbalized understanding of the proper use and possible adverse effects of benzoyl peroxide.  All of the patient's questions and concerns were addressed. Bactrim Counseling:  I discussed with the patient the risks of sulfa antibiotics including but not limited to GI upset, allergic reaction, drug rash, diarrhea, dizziness, photosensitivity, and yeast infections.  Rarely, more serious reactions can occur including but not limited to aplastic anemia, agranulocytosis, methemoglobinemia, blood dyscrasias, liver or kidney failure, lung infiltrates or desquamative/blistering drug rashes. Dapsone Counseling: I discussed with the patient the risks of dapsone including but not limited to hemolytic anemia, agranulocytosis, rashes, methemoglobinemia, kidney failure, peripheral neuropathy, headaches, GI upset, and liver toxicity.  Patients who start dapsone require monitoring including baseline LFTs and weekly CBCs for the first month, then every month thereafter.  The patient verbalized understanding of the proper use and possible adverse effects of dapsone.  All of the patient's questions and concerns were addressed. Isotretinoin Counseling: Patient should get monthly blood tests, not donate blood, not drive at night if vision affected, not share medication, and not undergo elective surgery for 6 months after tx completed. Side effects reviewed, pt to contact office should one occur. Birth Control Pills Pregnancy And Lactation Text: This medication should be avoided if pregnant and for the first 30 days post-partum. Minocycline Counseling: Patient advised regarding possible photosensitivity and discoloration of the teeth, skin, lips, tongue and gums.  Patient instructed to avoid sunlight, if possible.  When exposed to sunlight, patients should wear protective clothing, sunglasses, and sunscreen.  The patient was instructed to call the office immediately if the following severe adverse effects occur:  hearing changes, easy bruising/bleeding, severe headache, or vision changes.  The patient verbalized understanding of the proper use and possible adverse effects of minocycline.  All of the patient's questions and concerns were addressed. High Dose Vitamin A Pregnancy And Lactation Text: High dose vitamin A therapy is contraindicated during pregnancy and breast feeding. Dapsone Pregnancy And Lactation Text: This medication is Pregnancy Category C and is not considered safe during pregnancy or breast feeding. Tetracycline Counseling: Patient counseled regarding possible photosensitivity and increased risk for sunburn.  Patient instructed to avoid sunlight, if possible.  When exposed to sunlight, patients should wear protective clothing, sunglasses, and sunscreen.  The patient was instructed to call the office immediately if the following severe adverse effects occur:  hearing changes, easy bruising/bleeding, severe headache, or vision changes.  The patient verbalized understanding of the proper use and possible adverse effects of tetracycline.  All of the patient's questions and concerns were addressed. Patient understands to avoid pregnancy while on therapy due to potential birth defects. Erythromycin Pregnancy And Lactation Text: This medication is Pregnancy Category B and is considered safe during pregnancy. It is also excreted in breast milk. High Dose Vitamin A Counseling: Side effects reviewed, pt to contact office should one occur. Spironolactone Pregnancy And Lactation Text: This medication can cause feminization of the male fetus and should be avoided during pregnancy. The active metabolite is also found in breast milk. Azithromycin Pregnancy And Lactation Text: This medication is considered safe during pregnancy and is also secreted in breast milk. Topical Sulfur Applications Pregnancy And Lactation Text: This medication is Pregnancy Category C and has an unknown safety profile during pregnancy. It is unknown if this topical medication is excreted in breast milk. Erythromycin Counseling:  I discussed with the patient the risks of erythromycin including but not limited to GI upset, allergic reaction, drug rash, diarrhea, increase in liver enzymes, and yeast infections. Birth Control Pills Counseling: Birth Control Pill Counseling: I discussed with the patient the potential side effects of OCPs including but not limited to increased risk of stroke, heart attack, thrombophlebitis, deep venous thrombosis, hepatic adenomas, breast changes, GI upset, headaches, and depression.  The patient verbalized understanding of the proper use and possible adverse effects of OCPs. All of the patient's questions and concerns were addressed. Doxycycline Counseling:  Patient counseled regarding possible photosensitivity and increased risk for sunburn.  Patient instructed to avoid sunlight, if possible.  When exposed to sunlight, patients should wear protective clothing, sunglasses, and sunscreen.  The patient was instructed to call the office immediately if the following severe adverse effects occur:  hearing changes, easy bruising/bleeding, severe headache, or vision changes.  The patient verbalized understanding of the proper use and possible adverse effects of doxycycline.  All of the patient's questions and concerns were addressed. Spironolactone Counseling: Patient advised regarding risks of diarrhea, abdominal pain, hyperkalemia, birth defects (for female patients), liver toxicity and renal toxicity. The patient may need blood work to monitor liver and kidney function and potassium levels while on therapy. The patient verbalized understanding of the proper use and possible adverse effects of spironolactone.  All of the patient's questions and concerns were addressed. Topical Retinoid Pregnancy And Lactation Text: This medication is Pregnancy Category C. It is unknown if this medication is excreted in breast milk. Topical Retinoid counseling:  Patient advised to apply a pea-sized amount only at bedtime and wait 30 minutes after washing their face before applying.  If too drying, patient may add a non-comedogenic moisturizer. The patient verbalized understanding of the proper use and possible adverse effects of retinoids.  All of the patient's questions and concerns were addressed. Tazorac Pregnancy And Lactation Text: This medication is not safe during pregnancy. It is unknown if this medication is excreted in breast milk. Topical Clindamycin Counseling: Patient counseled that this medication may cause skin irritation or allergic reactions.  In the event of skin irritation, the patient was advised to reduce the amount of the drug applied or use it less frequently.   The patient verbalized understanding of the proper use and possible adverse effects of clindamycin.  All of the patient's questions and concerns were addressed. Tetracycline Pregnancy And Lactation Text: This medication is Pregnancy Category D and not consider safe during pregnancy. It is also excreted in breast milk. Bactrim Pregnancy And Lactation Text: This medication is Pregnancy Category D and is known to cause fetal risk.  It is also excreted in breast milk. Doxycycline Pregnancy And Lactation Text: This medication is Pregnancy Category D and not consider safe during pregnancy. It is also excreted in breast milk but is considered safe for shorter treatment courses. Detail Level: Zone

## 2019-10-31 NOTE — ED CLERICAL - NS ED CLERK UNITS
Problem: Respiratory Impairment - Respiratory Therapy 253  Intervention: Inhaled medication delivery  Intervention Status  Done  Intervention: Inhaled gas administration  Intervention Status  Done    Patient on Room air         [id number]

## 2020-05-21 NOTE — PHYSICAL THERAPY INITIAL EVALUATION ADULT - PHYSICAL ASSIST/NONPHYSICAL ASSIST: SIT/SUPINE, REHAB EVAL
1. Caller Name: Vee Shipley                       Call Back Number: [phone]  [facility] PCP or Specialty Provider: No          2.  Does patient have any active symptoms of respiratory illness? No.    3.  Does patient have any comoribidities? COPD    4.  Has the patient traveled in the last 14 days OR had any known contact with someone who is suspected or confirmed to have COVID-19?  No.    5. Disposition: Cleared by RN Triage as potential is low for COVID-19; OK to keep/schedule appointment    Note routed to [facility] Provider: JIMI only.    1 person assist

## 2020-07-14 NOTE — ED ADULT NURSE NOTE - IN THE PAST YEAR, HOW OFTEN HAVE YOU USED TOBACCO PRODUCTS?
Patient interviewed and examined.  .  Chief Complaint   Patient presents with   • Groin Pain      .  Supervisory note: 89-year-old nursing home resident who states that Sunday he was getting out of bed, twisted his legs to get out of bed developed stabbing right groin discomfort and right hip pain.  Patient denies fall.  Patient denies abdominal pain or flank pain or fever or chills.  Patient had an abnormal ultrasound at [facility] and reported 10 out of 10 pain so was sent here for further evaluation.        I have reviewed and discussed the case with the midlevel provider. I have spent face to face time with the patient and agree with the findings except where otherwise documented.     PAST MEDICAL HX:    Restless leg syndrome                                         Essential (primary) hypertension                              High cholesterol                                              There is no problem list on file for this patient.      PAST SURGICAL HX:  There is no previous surgical history on file.    History reviewed. No pertinent family history.  Review of patient's family status indicates:  No family status on file  No significant past family medical history    Social History     Tobacco Use   • Smoking status: Never Smoker   • Smokeless tobacco: Never Used   Substance Use Topics   • Alcohol use: Not Currently     Frequency: Never   • Drug use: Never        PE    Vitals:    07/14/20 1610   BP: 129/70   Pulse: 81   Resp: 18   Temp: 98.1 °F (36.7 °C)   TempSrc: Oral   SpO2: 100%       My Exam Shows : General: Alert and oriented and nontoxic  CV: Regular rate and rhythm, murmur present  Lungs: Clear to auscultation, no wheeze  Abdomen: No tenderness or pulsatile mass, bowel sounds are present and normal, no hernia  Extremity: No C/C/E, pain with range of motion of the right lower extremity in the hip area  Neuro: No gross focal neurologic deficit  HEENT: Atraumatic normocephalic, EOMI,  PERRLA  Skin: Dry, no petechia or purpura          The case was reviewed with the patient. Nursing notes reviewed.    No results found for this visit on 07/14/20.     XR HIP 2 VIEWS RIGHT W PELVIS 1 VIEW   Final Result         1.  No radiographic evidence of acute fracture/dislocation within the pelvis/left hip.         2.  Symmetric moderate degenerative changes identified within the bilateral hips.      3.  Significant degenerative changes identified in the lower lumbar spine.      Electronically Signed by: VLADIMIR SARMIENTO M.D.    Signed on: 7/14/2020 4:50 PM          US VASC EXTREMITY LOWER VENOUS DUPLEX - RIGHT    (Results Pending)        Imaging Results          XR HIP 2 VIEWS RIGHT W PELVIS 1 VIEW (Final result)  Result time 07/14/20 16:50:45    Final result                 Impression:        1.  No radiographic evidence of acute fracture/dislocation within the pelvis/left hip.      2.  Symmetric moderate degenerative changes identified within the bilateral hips.    3.  Significant degenerative changes identified in the lower lumbar spine.    Electronically Signed by: VLADIMIR SARMIENTO M.D.   Signed on: 7/14/2020 4:50 PM                Narrative:    Pelvis: Single view    Right hip: Two views    INDICATION: Pain.    FINDINGS: Comparison is made with study dated 10/07/2017    Bone mineralization is normal.    No acute fracture/dislocation is clearly demonstrated.    No destructive bony lesions are seen.    Moderate degenerative changes are identified within both hips.    Significant degenerative changes are identified within the lower lumbar spine.    Metallic implants are identified in the region of the prostate gland.                                  Medications - No data to display    ED Course as of Jul 15 1512   Tue Jul 14, 2020   1837 X-rays negative for acute fracture, but shows degenerative changes.  US called and believes there was an error and that ultrasound was performed on the incorrect leg, and  patient will return to ultrasound for right lower extremity ultrasound.  I informed the patient.     [AD]   1908 Ultrasound shows 6cm x 2 cm x 3 cm Baker's cyst with mild associated synovitis.  No DVT.  Will discharge home with prescription for Ultram to take if needed for severe pain.  Recommend close PCP follow-up.  Discussed ED return cautions.  Patient understands and is agreeable plan.    [AD]      ED Course User Index  [AD] Nidhi Curtis PA-C          ED Diagnosis     None           No follow-up provider specified.       Summary of your Discharge Medications      You have not been prescribed any medications.           Dat Troy MD  07/15/20 1562     Never

## 2021-06-07 NOTE — DISCHARGE NOTE ADULT - MEDICATION SUMMARY - MEDICATIONS TO STOP TAKING
Bilat LE's n/a except pt able to wiggle toes Bilat/no Active ROM deficits were identified I will STOP taking the medications listed below when I get home from the hospital:  None

## 2022-09-15 NOTE — ED ADULT NURSE NOTE - CAS DISCH CONDITION
"Chief Complaint   Patient presents with     Follow Up     PSA       Blood pressure 122/74, pulse 71, height 1.727 m (5' 8\"), weight 88.5 kg (195 lb). Body mass index is 29.65 kg/m .    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     Colonic polyp     Elevated PSA     Heel fracture     Hyponatremia     Nephrolithiasis     Pain of finger of right hand     Sarcoidosis of lung (H)     Sialoadenitis     Adhesive capsulitis of shoulder     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)     Chronic kidney disease, stage 3 (H)     Peripheral vascular disease, unspecified (H)     Malignant neoplasm of prostate (H)     Chronic bilateral low back pain with bilateral sciatica     Pain of both sacroiliac joints       No Known Allergies    Current Outpatient Medications   Medication Sig Dispense Refill     albuterol (VENTOLIN HFA) 108 (90 Base) MCG/ACT inhaler Inhale 2 puffs into the lungs every 6 hours 18 g 3     alpha-lipoic acid 600 MG capsule Take 1 capsule (600 mg) by mouth daily 90 capsule 3     amLODIPine (NORVASC) 5 MG tablet Take 1 tablet (5 mg) by mouth At Bedtime 90 tablet 3     ARTIFICIAL TEAR OP Apply to eye as needed       aspirin 81 MG tablet Take 1 tablet by mouth At Bedtime        atorvastatin (LIPITOR) 20 MG tablet Take 1 tablet (20 mg) by mouth daily 90 tablet 3     blood glucose (NO BRAND " SPECIFIED) lancets standard Lancets that go with device, Test 3 times daily 300 each 3     blood glucose monitoring (NO BRAND SPECIFIED) meter device kit Any meter covered by insurance, not store brand, use as directed. 1 kit 0     blood glucose monitoring (NO BRAND SPECIFIED) test strip Strips that go with meter, covered by insurance. Test 3 times daily 300 strip 3     Blood Pressure Monitor KIT Automatic Blood Pressure Monitor 1 kit 0     camphor-menthol (DERMASARRA) 0.5-0.5 % external lotion Apply lotion 2-3 times per day to itchy areas. If not improving in two weeks, follow up with PCP. 222 mL 0     camphor-menthol (DERMASARRA) 0.5-0.5 % external lotion Apply to arms legs torso 1-2 times a day for itching 1 Bottle 11     cholecalciferol (VITAMIN D3) 25 mcg (1000 units) capsule Take 2 capsules (50 mcg) by mouth daily 180 capsule 3     clobetasol (TEMOVATE) 0.05 % ointment Apply topically to legs twice daily for 2 weeks.  Then discontinue (Patient taking differently: Apply topically to legs twice daily for 2 weeks.  Then discontinue) 30 g 0     clotrimazole (LOTRIMIN) 1 % cream Apply topically 2 times daily 30 g 3     Continuous Blood Gluc Sensor (FREESTYLE PETER 2 SENSOR) [facility] 1 each every 14 days 1 each every 14 days. Change every 14 days. 2 each 5     dextromethorphan-guaiFENesin (MUCINEX DM)  MG 12 hr tablet Take 1 tablet by mouth every 12 hours 30 tablet 0     dextromethorphan-guaiFENesin (TUSSIN DM)  MG/5ML liquid Take 5 mLs by mouth 2 times daily as needed 118 mL 0     diclofenac (VOLTAREN) 1 % topical gel Apply 4 grams to thigh up to 4 times daily for pain 100 g 1     empagliflozin (JARDIANCE) 10 MG TABS tablet Take 1 tablet (10 mg) by mouth daily 90 tablet 0     famotidine (PEPCID) 20 MG tablet Take 1 tablet (20 mg) by mouth 2 times daily 180 tablet 3     fenofibrate 54 MG tablet Take 1 tablet (54 mg) by mouth daily (Patient taking differently: Take 54 mg by mouth At Bedtime) 90 tablet 0      "finasteride (PROSCAR) 5 MG tablet Take 1 tablet (5 mg) by mouth daily (Please keep visit for 9/1/2022 for further refills) Thank you 90 tablet 0     fluocinonide (LIDEX) 0.05 % external cream   3     fluticasone (FLONASE) 50 MCG/ACT nasal spray Spray 1 spray into both nostrils daily 16 g 0     fluticasone (FLOVENT HFA) 110 MCG/ACT inhaler Inhale 1 puff into the lungs 2 times daily 12 g 11     gabapentin (NEURONTIN) 300 MG capsule TAKE 1 CAPSULE BY MOUTH TWICE A DAY 60 capsule 3     gabapentin (NEURONTIN) 300 MG capsule TAKE 1 CAPSULE BY MOUTH TWICE A  capsule 0     HUMALOG KWIKPEN 100 UNIT/ML soln Inject 15-17  units with meals, plus correction. Pt uses approx 65 units in 24 hrs. 60 mL 1     insulin degludec (TRESIBA FLEXTOUCH) 100 UNIT/ML pen Inject 64 units subcu at bedtime. 60 mL 3     insulin pen needle (B-D U/F) 31G X 5 MM miscellaneous Use 4 time(s) per day.  Please dispense as BD Pen Needle Mini U/F 31G x 5  each 3     insulin pen needle (B-D U/F) 31G X 5 MM Use 4 times per day.  Please dispense as BD Pen Needle Mini U/F 31G x 5  each 3     insulin syringe 31G X 5/16\" 0.5 ML MISC Use three syringes daily 270 each 1     ketamine 5% gabapentin 8% lidocaine 2.5% topical PLO cream Apply 1 g topically 3 times daily 30 g 3     loratadine (CLARITIN) 10 MG tablet Take 1 tablet (10 mg) by mouth daily 90 tablet 0     losartan (COZAAR) 100 MG tablet Take 1 tablet (100 mg) by mouth At Bedtime 90 tablet 3     mupirocin (BACTROBAN) 2 % cream Apply  topically. In very small amounts only as needed 15 g 1     Omega-3 Fatty Acids (OMEGA-3 FISH OIL) 1000 MG CAPS Take 1 capsule (1 g) by mouth 2 times daily 60 capsule 11     ONETOUCH ULTRA test strip Use to test blood sugar 3 times daily 300 strip 3     semaglutide (OZEMPIC, 0.25 OR 0.5 MG/DOSE,) 2 MG/1.5ML SOPN pen Inject 0.5 mg subcutaneous once a week. 1.5 mL 3     sodium bicarbonate 650 MG tablet Take 1 tablet (650 mg) by mouth 3 times daily 90 tablet 11 "     tamsulosin (FLOMAX) 0.4 MG capsule Take 1 capsule (0.4 mg) by mouth daily Please keep appt 9/1/22 30 capsule 0     triamcinolone (KENALOG) 0.1 % cream Apply topically 3 times daily 80 g 0     VITAMIN D3 25 MCG (1000 UT) tablet TAKE 2 TABLETS (50 MCG) BY MOUTH DAILY         Social History     Tobacco Use     Smoking status: Never Smoker     Smokeless tobacco: Never Used   Substance Use Topics     Alcohol use: Yes     Comment: occasionaly     Drug use: No       Elsie Moran  9/15/2022  3:02 PM   Stable

## 2024-03-17 NOTE — CONSULT NOTE ADULT - PROBLEM SELECTOR PROBLEM 5
GENERAL: alert, apparent akathisia, very flushed  HEAD:  Atraumatic, Normocephalic  EYES: EOMI, PERRLA  ENT: Moist mucous membranes  NECK: Supple  CHEST/LUNG: Clear to auscultation bilaterally. Unlabored respirations  HEART: Tachycardic  ABDOMEN: Distended abdomen, mildly tender to palpation diffusely  EXTREMITIES: WWP  NERVOUS SYSTEM:  A&Ox3, tremulous  SKIN: red and flushed face, otherwise no gross lesions or rashes Prophylactic measure

## 2025-03-05 NOTE — ED ADULT NURSE NOTE - PRIMARY CARE PROVIDER
Called patient to schedule a follow up exam per Dr Landa request.      Left message to call back     dr sharp